# Patient Record
Sex: MALE | Race: BLACK OR AFRICAN AMERICAN | Employment: OTHER | URBAN - METROPOLITAN AREA
[De-identification: names, ages, dates, MRNs, and addresses within clinical notes are randomized per-mention and may not be internally consistent; named-entity substitution may affect disease eponyms.]

---

## 2017-02-09 RX ORDER — LISINOPRIL 10 MG/1
TABLET ORAL
Qty: 90 TABLET | Refills: 0 | Status: SHIPPED | OUTPATIENT
Start: 2017-02-09 | End: 2017-05-08 | Stop reason: SDUPTHER

## 2017-02-13 RX ORDER — DOLUTEGRAVIR SODIUM 50 MG/1
TABLET, FILM COATED ORAL
Qty: 90 TABLET | Refills: 2 | Status: SHIPPED | OUTPATIENT
Start: 2017-02-13 | End: 2017-11-10 | Stop reason: SDUPTHER

## 2017-03-27 RX ORDER — HYDROCHLOROTHIAZIDE 25 MG/1
TABLET ORAL
Qty: 90 TABLET | Refills: 3 | Status: SHIPPED | OUTPATIENT
Start: 2017-03-27 | End: 2018-03-22 | Stop reason: SDUPTHER

## 2017-04-17 ENCOUNTER — OFFICE VISIT (OUTPATIENT)
Dept: INFECTIOUS DISEASES | Age: 77
End: 2017-04-17

## 2017-04-17 VITALS
TEMPERATURE: 98.2 F | BODY MASS INDEX: 25.77 KG/M2 | DIASTOLIC BLOOD PRESSURE: 72 MMHG | SYSTOLIC BLOOD PRESSURE: 120 MMHG | WEIGHT: 190 LBS

## 2017-04-17 DIAGNOSIS — E05.90 HYPERTHYROIDISM: ICD-10-CM

## 2017-04-17 DIAGNOSIS — B20 HUMAN IMMUNODEFICIENCY VIRUS (HIV) DISEASE (HCC): Primary | ICD-10-CM

## 2017-04-17 DIAGNOSIS — H05.20 EXOPHTHALMOS: ICD-10-CM

## 2017-04-17 PROCEDURE — 99215 OFFICE O/P EST HI 40 MIN: CPT | Performed by: INTERNAL MEDICINE

## 2017-05-08 RX ORDER — LISINOPRIL 10 MG/1
TABLET ORAL
Qty: 90 TABLET | Refills: 3 | Status: SHIPPED | OUTPATIENT
Start: 2017-05-08 | End: 2018-05-03 | Stop reason: SDUPTHER

## 2017-08-21 ENCOUNTER — OFFICE VISIT (OUTPATIENT)
Dept: INFECTIOUS DISEASES | Age: 77
End: 2017-08-21

## 2017-08-21 VITALS
DIASTOLIC BLOOD PRESSURE: 84 MMHG | SYSTOLIC BLOOD PRESSURE: 120 MMHG | BODY MASS INDEX: 25.09 KG/M2 | TEMPERATURE: 98.6 F | WEIGHT: 185 LBS

## 2017-08-21 DIAGNOSIS — B20 HUMAN IMMUNODEFICIENCY VIRUS (HIV) DISEASE (HCC): Primary | ICD-10-CM

## 2017-08-21 DIAGNOSIS — B20 HUMAN IMMUNODEFICIENCY VIRUS (HIV) DISEASE (HCC): ICD-10-CM

## 2017-08-21 DIAGNOSIS — H05.20 EXOPHTHALMOS: ICD-10-CM

## 2017-08-21 DIAGNOSIS — E05.90 HYPERTHYROIDISM: ICD-10-CM

## 2017-08-21 LAB
A/G RATIO: 1.8 (ref 1.1–2.2)
ALBUMIN SERPL-MCNC: 4.7 G/DL (ref 3.4–5)
ALP BLD-CCNC: 50 U/L (ref 40–129)
ALT SERPL-CCNC: 16 U/L (ref 10–40)
ANION GAP SERPL CALCULATED.3IONS-SCNC: 13 MMOL/L (ref 3–16)
AST SERPL-CCNC: 16 U/L (ref 15–37)
BASOPHILS ABSOLUTE: 0 K/UL (ref 0–0.2)
BASOPHILS RELATIVE PERCENT: 0.6 %
BILIRUB SERPL-MCNC: 0.5 MG/DL (ref 0–1)
BILIRUBIN URINE: NEGATIVE
BLOOD, URINE: NEGATIVE
BUN BLDV-MCNC: 14 MG/DL (ref 7–20)
CALCIUM SERPL-MCNC: 9.7 MG/DL (ref 8.3–10.6)
CHLORIDE BLD-SCNC: 97 MMOL/L (ref 99–110)
CHOLESTEROL, FASTING: 174 MG/DL (ref 0–199)
CLARITY: CLEAR
CO2: 28 MMOL/L (ref 21–32)
COLOR: YELLOW
CREAT SERPL-MCNC: 1.1 MG/DL (ref 0.8–1.3)
EOSINOPHILS ABSOLUTE: 0.1 K/UL (ref 0–0.6)
EOSINOPHILS RELATIVE PERCENT: 2.3 %
EPITHELIAL CELLS, UA: 0 /HPF (ref 0–5)
GFR AFRICAN AMERICAN: >60
GFR NON-AFRICAN AMERICAN: >60
GLOBULIN: 2.6 G/DL
GLUCOSE BLD-MCNC: 104 MG/DL (ref 70–99)
GLUCOSE URINE: NEGATIVE MG/DL
HCT VFR BLD CALC: 43.6 % (ref 40.5–52.5)
HDLC SERPL-MCNC: 39 MG/DL (ref 40–60)
HEMOGLOBIN: 14.1 G/DL (ref 13.5–17.5)
HYALINE CASTS: 0 /LPF (ref 0–8)
KETONES, URINE: NEGATIVE MG/DL
LDL CHOLESTEROL CALCULATED: 112 MG/DL
LEUKOCYTE ESTERASE, URINE: NEGATIVE
LYMPHOCYTES ABSOLUTE: 1.1 K/UL (ref 1–5.1)
LYMPHOCYTES RELATIVE PERCENT: 34.5 %
MCH RBC QN AUTO: 27.9 PG (ref 26–34)
MCHC RBC AUTO-ENTMCNC: 32.4 G/DL (ref 31–36)
MCV RBC AUTO: 86.2 FL (ref 80–100)
MICROSCOPIC EXAMINATION: NORMAL
MONOCYTES ABSOLUTE: 0.4 K/UL (ref 0–1.3)
MONOCYTES RELATIVE PERCENT: 12.7 %
NEUTROPHILS ABSOLUTE: 1.6 K/UL (ref 1.7–7.7)
NEUTROPHILS RELATIVE PERCENT: 49.9 %
NITRITE, URINE: NEGATIVE
PDW BLD-RTO: 15.2 % (ref 12.4–15.4)
PH UA: 6
PLATELET # BLD: 171 K/UL (ref 135–450)
PMV BLD AUTO: 8.8 FL (ref 5–10.5)
POTASSIUM SERPL-SCNC: 4.5 MMOL/L (ref 3.5–5.1)
PROTEIN UA: NEGATIVE MG/DL
RBC # BLD: 5.06 M/UL (ref 4.2–5.9)
RBC UA: 3 /HPF (ref 0–4)
SODIUM BLD-SCNC: 138 MMOL/L (ref 136–145)
SPECIFIC GRAVITY UA: 1.02
TOTAL PROTEIN: 7.3 G/DL (ref 6.4–8.2)
TRIGLYCERIDE, FASTING: 114 MG/DL (ref 0–150)
UROBILINOGEN, URINE: 0.2 E.U./DL
VLDLC SERPL CALC-MCNC: 23 MG/DL
WBC # BLD: 3.3 K/UL (ref 4–11)
WBC UA: 0 /HPF (ref 0–5)

## 2017-08-21 PROCEDURE — 99215 OFFICE O/P EST HI 40 MIN: CPT | Performed by: INTERNAL MEDICINE

## 2017-08-22 LAB — RPR: NORMAL

## 2017-08-23 LAB
HIV RNA PCR INTERPRETATION: NOT DETECTED
HIV-1 RNA BY PCR, QN: <1.3 LOG
HIV-1 RNA BY PCR, QN: <20 CPY/ML

## 2017-08-25 ENCOUNTER — TELEPHONE (OUTPATIENT)
Dept: INFECTIOUS DISEASES | Age: 77
End: 2017-08-25

## 2017-09-22 ENCOUNTER — TELEPHONE (OUTPATIENT)
Dept: INFECTIOUS DISEASES | Age: 77
End: 2017-09-22

## 2017-09-22 RX ORDER — SILDENAFIL 50 MG/1
100 TABLET, FILM COATED ORAL PRN
Qty: 3 TABLET | Refills: 5 | Status: SHIPPED | OUTPATIENT
Start: 2017-09-22 | End: 2022-03-16

## 2017-11-09 RX ORDER — EMTRICITABINE AND TENOFOVIR ALAFENAMIDE 200; 25 MG/1; MG/1
TABLET ORAL
Qty: 90 TABLET | Refills: 0 | Status: SHIPPED | OUTPATIENT
Start: 2017-11-09 | End: 2018-01-28 | Stop reason: SDUPTHER

## 2017-11-10 RX ORDER — DOLUTEGRAVIR SODIUM 50 MG/1
TABLET, FILM COATED ORAL
Qty: 90 TABLET | Refills: 2 | Status: SHIPPED | OUTPATIENT
Start: 2017-11-10 | End: 2018-08-13 | Stop reason: SDUPTHER

## 2018-01-29 RX ORDER — EMTRICITABINE AND TENOFOVIR ALAFENAMIDE 200; 25 MG/1; MG/1
TABLET ORAL
Qty: 90 TABLET | Refills: 0 | Status: SHIPPED | OUTPATIENT
Start: 2018-01-29 | End: 2018-05-07 | Stop reason: SDUPTHER

## 2018-02-19 ENCOUNTER — OFFICE VISIT (OUTPATIENT)
Dept: INFECTIOUS DISEASES | Age: 78
End: 2018-02-19

## 2018-02-19 VITALS
DIASTOLIC BLOOD PRESSURE: 79 MMHG | HEIGHT: 73 IN | TEMPERATURE: 97.5 F | WEIGHT: 190 LBS | BODY MASS INDEX: 25.18 KG/M2 | SYSTOLIC BLOOD PRESSURE: 130 MMHG

## 2018-02-19 DIAGNOSIS — B20 HUMAN IMMUNODEFICIENCY VIRUS (HIV) DISEASE (HCC): ICD-10-CM

## 2018-02-19 DIAGNOSIS — B20 HUMAN IMMUNODEFICIENCY VIRUS (HIV) DISEASE (HCC): Primary | ICD-10-CM

## 2018-02-19 DIAGNOSIS — H05.20 EXOPHTHALMOS: ICD-10-CM

## 2018-02-19 LAB
A/G RATIO: 1.9 (ref 1.1–2.2)
ALBUMIN SERPL-MCNC: 4.9 G/DL (ref 3.4–5)
ALP BLD-CCNC: 51 U/L (ref 40–129)
ALT SERPL-CCNC: 22 U/L (ref 10–40)
ANION GAP SERPL CALCULATED.3IONS-SCNC: 15 MMOL/L (ref 3–16)
AST SERPL-CCNC: 18 U/L (ref 15–37)
BASOPHILS ABSOLUTE: 0 K/UL (ref 0–0.2)
BASOPHILS RELATIVE PERCENT: 0.6 %
BILIRUB SERPL-MCNC: 0.5 MG/DL (ref 0–1)
BILIRUBIN URINE: NEGATIVE
BLOOD, URINE: NEGATIVE
BUN BLDV-MCNC: 12 MG/DL (ref 7–20)
CALCIUM SERPL-MCNC: 9.7 MG/DL (ref 8.3–10.6)
CHLORIDE BLD-SCNC: 101 MMOL/L (ref 99–110)
CHOLESTEROL, FASTING: 186 MG/DL (ref 0–199)
CLARITY: CLEAR
CO2: 27 MMOL/L (ref 21–32)
COLOR: YELLOW
CREAT SERPL-MCNC: 1.2 MG/DL (ref 0.8–1.3)
EOSINOPHILS ABSOLUTE: 0.1 K/UL (ref 0–0.6)
EOSINOPHILS RELATIVE PERCENT: 3.6 %
EPITHELIAL CELLS, UA: 0 /HPF (ref 0–5)
GFR AFRICAN AMERICAN: >60
GFR NON-AFRICAN AMERICAN: 59
GLOBULIN: 2.6 G/DL
GLUCOSE BLD-MCNC: 95 MG/DL (ref 70–99)
GLUCOSE URINE: NEGATIVE MG/DL
HCT VFR BLD CALC: 42.4 % (ref 40.5–52.5)
HDLC SERPL-MCNC: 35 MG/DL (ref 40–60)
HEMOGLOBIN: 14.3 G/DL (ref 13.5–17.5)
HYALINE CASTS: 0 /LPF (ref 0–8)
KETONES, URINE: NEGATIVE MG/DL
LDL CHOLESTEROL CALCULATED: 123 MG/DL
LEUKOCYTE ESTERASE, URINE: NEGATIVE
LYMPHOCYTES ABSOLUTE: 1.2 K/UL (ref 1–5.1)
LYMPHOCYTES RELATIVE PERCENT: 39.1 %
MCH RBC QN AUTO: 28.6 PG (ref 26–34)
MCHC RBC AUTO-ENTMCNC: 33.7 G/DL (ref 31–36)
MCV RBC AUTO: 84.8 FL (ref 80–100)
MICROSCOPIC EXAMINATION: NORMAL
MONOCYTES ABSOLUTE: 0.4 K/UL (ref 0–1.3)
MONOCYTES RELATIVE PERCENT: 12.1 %
NEUTROPHILS ABSOLUTE: 1.4 K/UL (ref 1.7–7.7)
NEUTROPHILS RELATIVE PERCENT: 44.6 %
NITRITE, URINE: NEGATIVE
PDW BLD-RTO: 15.4 % (ref 12.4–15.4)
PH UA: 6
PLATELET # BLD: 165 K/UL (ref 135–450)
PMV BLD AUTO: 8.8 FL (ref 5–10.5)
POTASSIUM SERPL-SCNC: 4.6 MMOL/L (ref 3.5–5.1)
PROTEIN UA: NEGATIVE MG/DL
RBC # BLD: 5 M/UL (ref 4.2–5.9)
RBC UA: 1 /HPF (ref 0–4)
SODIUM BLD-SCNC: 143 MMOL/L (ref 136–145)
SPECIFIC GRAVITY UA: 1.02
TOTAL PROTEIN: 7.5 G/DL (ref 6.4–8.2)
TRIGLYCERIDE, FASTING: 139 MG/DL (ref 0–150)
UROBILINOGEN, URINE: 0.2 E.U./DL
VLDLC SERPL CALC-MCNC: 28 MG/DL
WBC # BLD: 3.1 K/UL (ref 4–11)
WBC UA: 0 /HPF (ref 0–5)

## 2018-02-19 PROCEDURE — 99215 OFFICE O/P EST HI 40 MIN: CPT | Performed by: INTERNAL MEDICINE

## 2018-02-19 NOTE — PROGRESS NOTES
daily.      amLODIPine (NORVASC) 10 MG tablet Take 1 tablet by mouth daily for 90 days. 90 tablet 3    nevirapine (VIRAMUNE) 200 MG tablet Take 1 tablet by mouth 2 times daily for 90 days. 180 tablet 3     No current facility-administered medications for this visit. Allergies:  Dye [iodides] and Pcn [penicillins]    Social History:    TOBACCO:    Never  ETOH:   None - quit  MARITAL STATUS:    (wife [address]  in 2016)  OCCUPATION:   Retired  (worked at [employer])    Family History:   No immunodeficiency. REVIEW OF SYSTEMS:    No fever / chills / sweats. Denies cough / sputum   Denies chest pain / palpitation. Denies n / v / abd pain. No diarrhea. Denies dysuria / frequency  Denies joint swelling or pain. No myalgia. Denies weight loss. PHYSICAL EXAM:    Vitals:    /79 (Site: Left Arm)   Temp 97.5 °F (36.4 °C) (Oral)   Ht 6' 1\" (1.854 m)   Wt 190 lb (86.2 kg)   BMI 25.07 kg/m²       GENERAL: No apparent distress. HEENT: Membranes moist, no conjunctival changes, no oral lesion - +exophthmos, no change  NECK:  Supple, no masses  LYMPH: No adenopathy   LUNGS: Clear b/l, no rales, no dullness  CARDIAC: RRR, no murmur appreciated  ABD:  + BS, soft / NT, no masses  :  Deferred   EXT:  No rash, no edema, no lesions  NEURO: No focal neurologic findings  PSYCH: Orientation, sensorium, mood normal      DATA:    12: WBC 3.3, Cr 1.2, AST / ALT , CD4 660, VL <20  13:  WBC 3.2, Cr 1.2, AST / ALT   10/1/13 WBC 3.2, Cr 1.2, CD4 526 (43%), VL <20  14 WBC 3.4, Cr 1.24, CD4 539 (46%), VL <20  10/8/14 WBC 3.1, Cr 1.3, CD4 526 (47%), VL <20  11/3/15 WBC 3.5, Cr 1.39, CD4 568, VL <20    IMPRESSION    # HIV, on neviripine / Clemmie Netters since 2012 (had been on d4T, ddI, nevirapine since ), last labs - see above, VL consistently suppressed by ultrasensitive assay, compliant / adherent and tolerating this regimen.   Modified HIV meds 10/8/14 - substituted

## 2018-02-21 LAB
C. TRACHOMATIS DNA ,URINE: NEGATIVE
HIV RNA PCR INTERPRETATION: NOT DETECTED
HIV-1 RNA BY PCR, QN: <1.3 LOG
HIV-1 RNA BY PCR, QN: <20 CPY/ML
N. GONORRHOEAE DNA, URINE: NEGATIVE

## 2018-02-23 ENCOUNTER — TELEPHONE (OUTPATIENT)
Dept: INFECTIOUS DISEASES | Age: 78
End: 2018-02-23

## 2018-03-22 RX ORDER — HYDROCHLOROTHIAZIDE 25 MG/1
TABLET ORAL
Qty: 90 TABLET | Refills: 3 | Status: SHIPPED | OUTPATIENT
Start: 2018-03-22 | End: 2019-03-18 | Stop reason: SDUPTHER

## 2018-05-03 RX ORDER — LISINOPRIL 10 MG/1
TABLET ORAL
Qty: 90 TABLET | Refills: 3 | Status: SHIPPED | OUTPATIENT
Start: 2018-05-03 | End: 2019-04-29 | Stop reason: SDUPTHER

## 2018-08-13 RX ORDER — DOLUTEGRAVIR SODIUM 50 MG/1
TABLET, FILM COATED ORAL
Qty: 90 TABLET | Refills: 2 | Status: SHIPPED | OUTPATIENT
Start: 2018-08-13 | End: 2019-05-01 | Stop reason: SDUPTHER

## 2018-08-20 ENCOUNTER — OFFICE VISIT (OUTPATIENT)
Dept: INFECTIOUS DISEASES | Age: 78
End: 2018-08-20

## 2018-08-20 VITALS
HEIGHT: 73 IN | DIASTOLIC BLOOD PRESSURE: 80 MMHG | SYSTOLIC BLOOD PRESSURE: 110 MMHG | BODY MASS INDEX: 25.45 KG/M2 | WEIGHT: 192 LBS | TEMPERATURE: 98.5 F

## 2018-08-20 DIAGNOSIS — B20 HUMAN IMMUNODEFICIENCY VIRUS (HIV) DISEASE (HCC): Primary | Chronic | ICD-10-CM

## 2018-08-20 DIAGNOSIS — B20 HUMAN IMMUNODEFICIENCY VIRUS (HIV) DISEASE (HCC): Chronic | ICD-10-CM

## 2018-08-20 DIAGNOSIS — E05.90 HYPERTHYROIDISM: Chronic | ICD-10-CM

## 2018-08-20 DIAGNOSIS — H05.20 EXOPHTHALMOS: Chronic | ICD-10-CM

## 2018-08-20 LAB
A/G RATIO: 1.8 (ref 1.1–2.2)
ALBUMIN SERPL-MCNC: 4.8 G/DL (ref 3.4–5)
ALP BLD-CCNC: 50 U/L (ref 40–129)
ALT SERPL-CCNC: 19 U/L (ref 10–40)
ANION GAP SERPL CALCULATED.3IONS-SCNC: 16 MMOL/L (ref 3–16)
AST SERPL-CCNC: 16 U/L (ref 15–37)
BASOPHILS ABSOLUTE: 0 K/UL (ref 0–0.2)
BASOPHILS RELATIVE PERCENT: 0.7 %
BILIRUB SERPL-MCNC: 0.5 MG/DL (ref 0–1)
BUN BLDV-MCNC: 13 MG/DL (ref 7–20)
CALCIUM SERPL-MCNC: 9.9 MG/DL (ref 8.3–10.6)
CHLORIDE BLD-SCNC: 95 MMOL/L (ref 99–110)
CO2: 29 MMOL/L (ref 21–32)
CREAT SERPL-MCNC: 1.3 MG/DL (ref 0.8–1.3)
EOSINOPHILS ABSOLUTE: 0.1 K/UL (ref 0–0.6)
EOSINOPHILS RELATIVE PERCENT: 2.3 %
GFR AFRICAN AMERICAN: >60
GFR NON-AFRICAN AMERICAN: 54
GLOBULIN: 2.7 G/DL
GLUCOSE BLD-MCNC: 102 MG/DL (ref 70–99)
HCT VFR BLD CALC: 45.3 % (ref 40.5–52.5)
HEMOGLOBIN: 14.8 G/DL (ref 13.5–17.5)
LYMPHOCYTES ABSOLUTE: 1.2 K/UL (ref 1–5.1)
LYMPHOCYTES RELATIVE PERCENT: 35.3 %
MCH RBC QN AUTO: 28 PG (ref 26–34)
MCHC RBC AUTO-ENTMCNC: 32.7 G/DL (ref 31–36)
MCV RBC AUTO: 85.7 FL (ref 80–100)
MONOCYTES ABSOLUTE: 0.4 K/UL (ref 0–1.3)
MONOCYTES RELATIVE PERCENT: 10.7 %
NEUTROPHILS ABSOLUTE: 1.7 K/UL (ref 1.7–7.7)
NEUTROPHILS RELATIVE PERCENT: 51 %
PDW BLD-RTO: 16 % (ref 12.4–15.4)
PLATELET # BLD: 173 K/UL (ref 135–450)
PMV BLD AUTO: 9.1 FL (ref 5–10.5)
POTASSIUM SERPL-SCNC: 4.3 MMOL/L (ref 3.5–5.1)
RBC # BLD: 5.29 M/UL (ref 4.2–5.9)
SODIUM BLD-SCNC: 140 MMOL/L (ref 136–145)
TOTAL PROTEIN: 7.5 G/DL (ref 6.4–8.2)
WBC # BLD: 3.4 K/UL (ref 4–11)

## 2018-08-20 PROCEDURE — 99215 OFFICE O/P EST HI 40 MIN: CPT | Performed by: INTERNAL MEDICINE

## 2018-08-20 NOTE — PROGRESS NOTES
for nevirapine yet CD4 down VL up and pt was NOT taking this regimen on 6/1/15. Substituted descovy for truvada 2016. 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  # Seasonal rhinitis- better, if worse try OTC cetirizine  # Watery eyes, may allergic, try antihistimine drops, cetirizine drop are ketotifen 1 ggt each eye  # Testicular 'cyst' - s/p removal  [facility]  # Anti-vaccine belief    HIV Database:   - Baseline: CD4, HIV RNA VL, HIV genotype, HLA B*5701 negative 7/27/15, hep serologies (10/2014 - A / B / C NR), RPR, G6PD;   - Annual: PPD, RPR , chol (every 6 mo if abnormal or on meds)   - 3-4 times a year: visit, CD4/VL, CBC, LFT  - Vaccines: influenza - refused 10/17/16, pneumovax 2012 / Reklaw Rater 2/2/15, hep A vaccine (immune - 10/8/14), hep B vaccine (immune, past infection 10/9/14)   - STD screening, cervical / anal ca screening, other vaccines (Tdap, Zoster), other cancer screening    HIV Health Maintenance:  HIV:  Diagnosis / RF 1991 / sexual - heterosexual partners   HIV genotype     HLA   17 negative   CD4    17 - 665 (52)   HIV VL   17 - <20     Vaccines:   Pneumovax   2012 - refused new    Prevnar  2/2/15   Meningococcal vax  Refuses    Flu vax   Refused    HAV vax  10/8/14 HAV ab +   HBV vax   10/8/14 HBV S ab +    Tdap    10/17/16      Labs   G6PD       RPR    17 NR    PPD or quantiferon  2/2/15 neg    HCV    10/8/14 neg     Lipid panel          / STD  Exam    10/17/16     GC/CT       Anal PAP/HPV  10/17/16 non-diagnostic, refuses repeat      Referrals:  PCP    Krystal Garcia  Endocrine  [place]      HIV Counselin. Discussed with patient transmission, infection and prevention of HIV infection.     2. Discussed with patient testing of CD4 count and HIV viral load and how these test relate to HIV care. 3. Discussed with patient ongoing test for HIV care including blood counts, LFT, PPD, RPR.  4. Discussed with patient measures to prevent HIV transmission to others. Discussed sexual transmission and safe sex practices. Discussed avoidance of needle sharing or use that may promote HIV transmission. 5. Asked patient what question he / she has and addressed concerns. RECOMMENDATIONS:      1. HIV treatment - cont descovy (emtricitabine / tenofovir - diff formulation) 1/d + dolutegravir (tivicay). 2. Adherence  3. Labs today - CBC with diff , CMP, CD4, HIV VL  4. OTC antihistamine for seasonal rhinitis; takes cetirizine (ketotifen) for watery eyes  5. BP med - Lisinopril 10/d, HCTZ 25/d; thyroid med - methimizole 5 mg/d - reviewed  6. RHCM - PPD (2/2015), pneumovax 4/17/17, prevnar 2/2/15, flu shot refused; RPR 3/1/16, chol panel 7/2015, MVI; never has had colonoscopy; Tdap 10/17/16; Refuses repeat anal cancer screen (swab for cytology / HPV - indeterminate / invalid 10/17/17 - refuses repeat - again 8/20/18)  7. Psychosocial -  7/2016, nutrition, exercise - discussed     Pt refuses indicated vaccine, procedures consistently - flu vaccine, meningococcal vaccine; colonoscopy, anal cancer screen. Have discussed risk of developing preventable disease. He concerned / calls test / vaccines 'experimenting'    Spent over 40 minutes with patient, reviewing data, discussing multiple problems and medications, formulating plan.   Over 50% of time with patient, educating and counseling about HIV care, medications, health screening    Jose Martinez MD

## 2018-08-23 LAB
HIV RNA PCR INTERPRETATION: DETECTED
HIV-1 RNA BY PCR, QN: 1.8 LOG
HIV-1 RNA BY PCR, QN: 59 CPY/ML

## 2018-08-24 ENCOUNTER — TELEPHONE (OUTPATIENT)
Dept: INFECTIOUS DISEASES | Age: 78
End: 2018-08-24

## 2019-01-28 ENCOUNTER — OFFICE VISIT (OUTPATIENT)
Dept: INFECTIOUS DISEASES | Age: 79
End: 2019-01-28
Payer: MEDICARE

## 2019-01-28 VITALS
DIASTOLIC BLOOD PRESSURE: 86 MMHG | BODY MASS INDEX: 25.18 KG/M2 | TEMPERATURE: 98.6 F | SYSTOLIC BLOOD PRESSURE: 140 MMHG | WEIGHT: 190 LBS | HEIGHT: 73 IN

## 2019-01-28 DIAGNOSIS — B20 HUMAN IMMUNODEFICIENCY VIRUS (HIV) DISEASE (HCC): Primary | ICD-10-CM

## 2019-01-28 DIAGNOSIS — E05.90 HYPERTHYROIDISM: ICD-10-CM

## 2019-01-28 DIAGNOSIS — H05.20 EXOPHTHALMOS: ICD-10-CM

## 2019-01-28 DIAGNOSIS — I10 ESSENTIAL HYPERTENSION: ICD-10-CM

## 2019-01-28 DIAGNOSIS — B20 HUMAN IMMUNODEFICIENCY VIRUS (HIV) DISEASE (HCC): ICD-10-CM

## 2019-01-28 LAB
A/G RATIO: 1.6 (ref 1.1–2.2)
ALBUMIN SERPL-MCNC: 4.9 G/DL (ref 3.4–5)
ALP BLD-CCNC: 55 U/L (ref 40–129)
ALT SERPL-CCNC: 22 U/L (ref 10–40)
ANION GAP SERPL CALCULATED.3IONS-SCNC: 14 MMOL/L (ref 3–16)
AST SERPL-CCNC: 18 U/L (ref 15–37)
BASOPHILS ABSOLUTE: 0 K/UL (ref 0–0.2)
BASOPHILS RELATIVE PERCENT: 0.6 %
BILIRUB SERPL-MCNC: 0.5 MG/DL (ref 0–1)
BUN BLDV-MCNC: 11 MG/DL (ref 7–20)
CALCIUM SERPL-MCNC: 10 MG/DL (ref 8.3–10.6)
CHLORIDE BLD-SCNC: 101 MMOL/L (ref 99–110)
CO2: 28 MMOL/L (ref 21–32)
CREAT SERPL-MCNC: 1.1 MG/DL (ref 0.8–1.3)
EOSINOPHILS ABSOLUTE: 0 K/UL (ref 0–0.6)
EOSINOPHILS RELATIVE PERCENT: 1.5 %
GFR AFRICAN AMERICAN: >60
GFR NON-AFRICAN AMERICAN: >60
GLOBULIN: 3.1 G/DL
GLUCOSE BLD-MCNC: 109 MG/DL (ref 70–99)
HCT VFR BLD CALC: 46.3 % (ref 40.5–52.5)
HEMOGLOBIN: 15.2 G/DL (ref 13.5–17.5)
LYMPHOCYTES ABSOLUTE: 1.1 K/UL (ref 1–5.1)
LYMPHOCYTES RELATIVE PERCENT: 34.7 %
MCH RBC QN AUTO: 28.4 PG (ref 26–34)
MCHC RBC AUTO-ENTMCNC: 32.9 G/DL (ref 31–36)
MCV RBC AUTO: 86.5 FL (ref 80–100)
MONOCYTES ABSOLUTE: 0.4 K/UL (ref 0–1.3)
MONOCYTES RELATIVE PERCENT: 11.6 %
NEUTROPHILS ABSOLUTE: 1.6 K/UL (ref 1.7–7.7)
NEUTROPHILS RELATIVE PERCENT: 51.6 %
PDW BLD-RTO: 15.5 % (ref 12.4–15.4)
PLATELET # BLD: 185 K/UL (ref 135–450)
PMV BLD AUTO: 8.9 FL (ref 5–10.5)
POTASSIUM SERPL-SCNC: 4.4 MMOL/L (ref 3.5–5.1)
RBC # BLD: 5.36 M/UL (ref 4.2–5.9)
SODIUM BLD-SCNC: 143 MMOL/L (ref 136–145)
TOTAL PROTEIN: 8 G/DL (ref 6.4–8.2)
WBC # BLD: 3 K/UL (ref 4–11)

## 2019-01-28 PROCEDURE — 99215 OFFICE O/P EST HI 40 MIN: CPT | Performed by: INTERNAL MEDICINE

## 2019-01-30 LAB
HIV-1 QNT LOG, IU/ML: NOT DETECTED LOG CPY/ML
HIV-1 QNT, IU/ML: NOT DETECTED CPY/ML
INTERPRETATION: NOT DETECTED

## 2019-02-01 ENCOUNTER — TELEPHONE (OUTPATIENT)
Dept: INFECTIOUS DISEASES | Age: 79
End: 2019-02-01

## 2019-03-18 RX ORDER — HYDROCHLOROTHIAZIDE 25 MG/1
TABLET ORAL
Qty: 90 TABLET | Refills: 3 | Status: SHIPPED | OUTPATIENT
Start: 2019-03-18 | End: 2020-09-22 | Stop reason: SDUPTHER

## 2019-04-29 RX ORDER — LISINOPRIL 10 MG/1
TABLET ORAL
Qty: 90 TABLET | Refills: 3 | Status: SHIPPED | OUTPATIENT
Start: 2019-04-29 | End: 2022-03-16 | Stop reason: DRUGHIGH

## 2019-05-01 RX ORDER — EMTRICITABINE AND TENOFOVIR ALAFENAMIDE 200; 25 MG/1; MG/1
TABLET ORAL
Qty: 90 TABLET | Refills: 3 | Status: SHIPPED | OUTPATIENT
Start: 2019-05-01 | End: 2020-02-05

## 2019-05-01 RX ORDER — DOLUTEGRAVIR SODIUM 50 MG/1
TABLET, FILM COATED ORAL
Qty: 90 TABLET | Refills: 2 | Status: SHIPPED | OUTPATIENT
Start: 2019-05-01 | End: 2020-01-14

## 2019-05-20 ENCOUNTER — OFFICE VISIT (OUTPATIENT)
Dept: INFECTIOUS DISEASES | Age: 79
End: 2019-05-20
Payer: MEDICARE

## 2019-05-20 VITALS
SYSTOLIC BLOOD PRESSURE: 122 MMHG | TEMPERATURE: 98.2 F | HEIGHT: 73 IN | BODY MASS INDEX: 25.45 KG/M2 | DIASTOLIC BLOOD PRESSURE: 80 MMHG | WEIGHT: 192 LBS

## 2019-05-20 DIAGNOSIS — B20 HUMAN IMMUNODEFICIENCY VIRUS (HIV) DISEASE (HCC): ICD-10-CM

## 2019-05-20 DIAGNOSIS — B20 HUMAN IMMUNODEFICIENCY VIRUS (HIV) DISEASE (HCC): Primary | ICD-10-CM

## 2019-05-20 DIAGNOSIS — E05.90 HYPERTHYROIDISM: ICD-10-CM

## 2019-05-20 DIAGNOSIS — H05.20 EXOPHTHALMOS: ICD-10-CM

## 2019-05-20 LAB
A/G RATIO: 1.7 (ref 1.1–2.2)
ALBUMIN SERPL-MCNC: 5 G/DL (ref 3.4–5)
ALP BLD-CCNC: 55 U/L (ref 40–129)
ALT SERPL-CCNC: 17 U/L (ref 10–40)
ANION GAP SERPL CALCULATED.3IONS-SCNC: 15 MMOL/L (ref 3–16)
AST SERPL-CCNC: 15 U/L (ref 15–37)
BASOPHILS ABSOLUTE: 0 K/UL (ref 0–0.2)
BASOPHILS RELATIVE PERCENT: 0.3 %
BILIRUB SERPL-MCNC: 0.6 MG/DL (ref 0–1)
BUN BLDV-MCNC: 13 MG/DL (ref 7–20)
CALCIUM SERPL-MCNC: 10 MG/DL (ref 8.3–10.6)
CHLORIDE BLD-SCNC: 99 MMOL/L (ref 99–110)
CO2: 27 MMOL/L (ref 21–32)
CREAT SERPL-MCNC: 1.3 MG/DL (ref 0.8–1.3)
EOSINOPHILS ABSOLUTE: 0.1 K/UL (ref 0–0.6)
EOSINOPHILS RELATIVE PERCENT: 1.8 %
GFR AFRICAN AMERICAN: >60
GFR NON-AFRICAN AMERICAN: 54
GLOBULIN: 3 G/DL
GLUCOSE BLD-MCNC: 106 MG/DL (ref 70–99)
HCT VFR BLD CALC: 45.6 % (ref 40.5–52.5)
HEMOGLOBIN: 14.9 G/DL (ref 13.5–17.5)
LYMPHOCYTES ABSOLUTE: 1.1 K/UL (ref 1–5.1)
LYMPHOCYTES RELATIVE PERCENT: 30.9 %
MCH RBC QN AUTO: 28.3 PG (ref 26–34)
MCHC RBC AUTO-ENTMCNC: 32.7 G/DL (ref 31–36)
MCV RBC AUTO: 86.6 FL (ref 80–100)
MONOCYTES ABSOLUTE: 0.5 K/UL (ref 0–1.3)
MONOCYTES RELATIVE PERCENT: 14 %
NEUTROPHILS ABSOLUTE: 1.8 K/UL (ref 1.7–7.7)
NEUTROPHILS RELATIVE PERCENT: 53 %
PDW BLD-RTO: 15.4 % (ref 12.4–15.4)
PLATELET # BLD: 190 K/UL (ref 135–450)
PMV BLD AUTO: 8.8 FL (ref 5–10.5)
POTASSIUM SERPL-SCNC: 4.6 MMOL/L (ref 3.5–5.1)
RBC # BLD: 5.26 M/UL (ref 4.2–5.9)
SODIUM BLD-SCNC: 141 MMOL/L (ref 136–145)
TOTAL PROTEIN: 8 G/DL (ref 6.4–8.2)
TSH REFLEX FT4: 3.43 UIU/ML (ref 0.27–4.2)
VITAMIN D 25-HYDROXY: 33.4 NG/ML
WBC # BLD: 3.5 K/UL (ref 4–11)

## 2019-05-20 PROCEDURE — 99215 OFFICE O/P EST HI 40 MIN: CPT | Performed by: INTERNAL MEDICINE

## 2019-05-20 NOTE — PROGRESS NOTES
Infectious Diseases HIV Outpatient Note    HIV history:   Diagnosed 1991 with lymphadenopathy. RF - multiple female sexual encounters (no MSM, IVDU). Seen in past by Dr Lindsey Roman and transferred to me 1/2012. Primary Care Physician: Arthur Cruz - PCP Dr Liliana Betancourt  History Obtained From:  Patient    CHIEF COMPLAINT:    Chief Complaint   Patient presents with    Follow-up       HISTORY OF PRESENT ILLNESS / Interval history:      Seen 10/9/13, 2/10/14, 6/9/14, 10/8/14, 2/2/15, 5/20/15, 6/1/15, 7/20/15, 10/26/15, 2/29/16, 6/13/16, 10/17/16, 4/17/17, 8/21/17, 2/19/18, 8/20/18, 1/28/19. Today 5/20/19, pt feels good, taking HIV meds. Taking meds as prescribed, \"never misses a dose\". Says, he wishes he did not have to take meds. No new problem. No illness since last visit. Still with watery eyes.      Past Medical History:    HIV+, HTN, seasonal allergies, hyperthyroid    Past Surgical History:    Axillary LN (1991)      Current Outpatient Medications   Medication Sig Dispense Refill    TIVICAY 50 MG tablet TAKE 1 TABLET DAILY 90 tablet 2    DESCOVY 200-25 MG TABS tablet TAKE 1 TABLET DAILY 90 tablet 3    lisinopril (PRINIVIL;ZESTRIL) 10 MG tablet TAKE 1 TABLET DAILY 90 tablet 3    hydrochlorothiazide (HYDRODIURIL) 25 MG tablet TAKE 1 TABLET DAILY 90 tablet 3    sildenafil (VIAGRA) 50 MG tablet Take 2 tablets by mouth as needed for Erectile Dysfunction Take 1 tablet PO as needed for Erectile Dysfunction 3 tablet 5    methimazole (TAPAZOLE) 10 MG tablet TAKE 1 TABLET DAILY 90 tablet 2    naproxen (NAPROSYN) 375 MG tablet Take 1 tablet by mouth 2 times daily (with meals) for 10 days 30 tablet 1    emtricitabine-tenofovir (TRUVADA) 200-300 MG per tablet Take 1 tablet by mouth daily 90 tablet 3    TRUVADA 200-300 MG per tablet TAKE 1 TABLET DAILY 30 tablet 5    dolutegravir sodium (TIVICAY) 50 MG tablet Take 1 tablet by mouth daily 30 tablet 3    hydrochlorothiazide (HYDRODIURIL) 25 MG tablet Take 25 mg by mouth daily.  amLODIPine (NORVASC) 10 MG tablet Take 1 tablet by mouth daily for 90 days. 90 tablet 3     No current facility-administered medications for this visit. Allergies:  Dye [iodides] and Pcn [penicillins]    Social History:    TOBACCO:    Never  ETOH:   None - quit  MARITAL STATUS:    (wife Vanessa Gerber  in 2016)  OCCUPATION:   Retired  (worked at [employer])    Family History:   No immunodeficiency. REVIEW OF SYSTEMS:    No fever / chills / sweats. Denies cough / sputum   Denies chest pain / palpitation. Denies n / v / abd pain. No diarrhea. Denies dysuria / frequency  Denies joint swelling or pain. No myalgia. Denies weight loss. PHYSICAL EXAM:    Vitals:    /80   Temp 98.2 °F (36.8 °C) (Oral)   Ht 6' 1\" (1.854 m)   Wt 192 lb (87.1 kg)   BMI 25.33 kg/m²       GENERAL: No apparent distress.     HEENT: Membranes moist, no conjunctival changes, no oral lesion - + exophthmos, no change  NECK:  Supple, no masses  LYMPH: No adenopathy   LUNGS: Clear b/l, no rales, no dullness  CARDIAC: RRR, no murmur appreciated  ABD:  + BS, soft / NT, no masses  :  Deferred   EXT:  No rash, no edema, no lesions  NEURO: No focal neurologic findings  PSYCH: Orientation, sensorium, mood normal      DATA:    12: WBC 3.3, Cr 1.2, AST / ALT 23 / 26, CD4 660, VL <20  13:  WBC 3.2, Cr 1.2, AST / ALT   10/1/13 WBC 3.2, Cr 1.2, CD4 526 (43%), VL <20  14 WBC 3.4, Cr 1.24, CD4 539 (46%), VL <20  10/8/14 WBC 3.1, Cr 1.3, CD4 526 (47%), VL <20  11/3/15 WBC 3.5, Cr 1.39, CD4 568, VL <20    IMPRESSION    HIV Database:   - Baseline: CD4, HIV RNA VL, HIV genotype, HLA B*5701 negative 7/27/15, hep serologies (10/2014 - A / B / C NR), RPR, G6PD;   - Annual: PPD, RPR , chol (every 6 mo if abnormal or on meds)   - 3-4 times a year: visit, CD4/VL, CBC, LFT  - Vaccines: influenza - refused 10/17/16, pneumovax 2012 / Guy Quan 2/2/15, hep A vaccine (immune - 10/8/14), hep B dysfunction - written for cialis, never used this  # testicular cyst - per pt, no details  # Seasonal rhinitis- better, if worse try OTC cetirizine  # Watery eyes, may allergic, try antihistimine drops, cetirizine drop are ketotifen 1 ggt each eye  # Testicular 'cyst' - s/p removal 7/18 Dr Est[facility]an Negr[facility]  # Anti-vaccine belief  # Does not want cancer screening    RECOMMENDATIONS:    1. HIV treatment - cont descovy (emtricitabine /TAF) 1/d + dolutegravir (tivicay). 2. Adherence  3. Labs today - CBC with diff , CMP, CD4, HIV VL, TSH, Vit D 25-OH  4. OTC antihistamine for seasonal rhinitis; takes cetirizine (ketotifen) for watery eyes  5. BP med - Lisinopril 10/d, HCTZ 25/d; thyroid med - methimizole 5 mg/d - reviewed  6. RHCM - PPD (2/2015), pneumovax 4/17/17, prevnar 2/2/15, flu shot refused; RPR 3/1/16, chol panel 7/2015, MVI; never has had colonoscopy; Tdap 10/17/16; Refuses repeat anal cancer screen (swab for cytology / HPV - indeterminate / invalid 10/17/17 - refuses repeat - again 8/20/18)  7. Psychosocial -  7/2016, nutrition, exercise - discussed     Pt refuses indicated vaccine, procedures consistently - flu vaccine, meningococcal vaccine; colonoscopy, anal cancer screen. Have discussed risk of developing preventable disease. He concerned / calls test / vaccines 'experimenting' - discussed all issues again today 1/28/19    Spent over 40 minutes with patient, reviewing data, discussing multiple problems and medications, formulating plan.   Over 50% of time with patient, educating and counseling about HIV care, medications, health screening    Silva Soares MD

## 2019-05-24 ENCOUNTER — TELEPHONE (OUTPATIENT)
Dept: INFECTIOUS DISEASES | Age: 79
End: 2019-05-24

## 2019-05-24 NOTE — TELEPHONE ENCOUNTER
Your viral load is undetectable, CD 4 is 631, creatinine is 1.3, all the other labs are normal    Left a message to call back for his results

## 2019-10-14 ENCOUNTER — OFFICE VISIT (OUTPATIENT)
Dept: INFECTIOUS DISEASES | Age: 79
End: 2019-10-14
Payer: MEDICARE

## 2019-10-14 ENCOUNTER — TELEPHONE (OUTPATIENT)
Dept: INFECTIOUS DISEASES | Age: 79
End: 2019-10-14

## 2019-10-14 VITALS
SYSTOLIC BLOOD PRESSURE: 138 MMHG | TEMPERATURE: 97.9 F | BODY MASS INDEX: 25.06 KG/M2 | WEIGHT: 185 LBS | DIASTOLIC BLOOD PRESSURE: 82 MMHG | HEIGHT: 72 IN

## 2019-10-14 DIAGNOSIS — E05.90 HYPERTHYROIDISM: ICD-10-CM

## 2019-10-14 DIAGNOSIS — B20 HUMAN IMMUNODEFICIENCY VIRUS (HIV) DISEASE (HCC): Primary | ICD-10-CM

## 2019-10-14 DIAGNOSIS — I10 ESSENTIAL HYPERTENSION: ICD-10-CM

## 2019-10-14 DIAGNOSIS — H05.20 EXOPHTHALMOS: ICD-10-CM

## 2019-10-14 DIAGNOSIS — B37.0 THRUSH: Primary | ICD-10-CM

## 2019-10-14 LAB
A/G RATIO: 2.1 (ref 1.1–2.2)
ALBUMIN SERPL-MCNC: 5.1 G/DL (ref 3.4–5)
ALP BLD-CCNC: 55 U/L (ref 40–129)
ALT SERPL-CCNC: 19 U/L (ref 10–40)
ANION GAP SERPL CALCULATED.3IONS-SCNC: 14 MMOL/L (ref 3–16)
AST SERPL-CCNC: 16 U/L (ref 15–37)
BASOPHILS ABSOLUTE: 0 K/UL (ref 0–0.2)
BASOPHILS RELATIVE PERCENT: 0.5 %
BILIRUB SERPL-MCNC: 0.5 MG/DL (ref 0–1)
BUN BLDV-MCNC: 13 MG/DL (ref 7–20)
CALCIUM SERPL-MCNC: 9.7 MG/DL (ref 8.3–10.6)
CHLORIDE BLD-SCNC: 100 MMOL/L (ref 99–110)
CO2: 28 MMOL/L (ref 21–32)
CREAT SERPL-MCNC: 1.2 MG/DL (ref 0.8–1.3)
EOSINOPHILS ABSOLUTE: 0.1 K/UL (ref 0–0.6)
EOSINOPHILS RELATIVE PERCENT: 2.3 %
GFR AFRICAN AMERICAN: >60
GFR NON-AFRICAN AMERICAN: 59
GLOBULIN: 2.4 G/DL
GLUCOSE BLD-MCNC: 126 MG/DL (ref 70–99)
HCT VFR BLD CALC: 43 % (ref 40.5–52.5)
HEMOGLOBIN: 14 G/DL (ref 13.5–17.5)
LYMPHOCYTES ABSOLUTE: 1 K/UL (ref 1–5.1)
LYMPHOCYTES RELATIVE PERCENT: 28.5 %
MCH RBC QN AUTO: 28.2 PG (ref 26–34)
MCHC RBC AUTO-ENTMCNC: 32.6 G/DL (ref 31–36)
MCV RBC AUTO: 86.3 FL (ref 80–100)
MONOCYTES ABSOLUTE: 0.4 K/UL (ref 0–1.3)
MONOCYTES RELATIVE PERCENT: 10.5 %
NEUTROPHILS ABSOLUTE: 2.1 K/UL (ref 1.7–7.7)
NEUTROPHILS RELATIVE PERCENT: 58.2 %
PDW BLD-RTO: 15.5 % (ref 12.4–15.4)
PLATELET # BLD: 184 K/UL (ref 135–450)
PMV BLD AUTO: 8.9 FL (ref 5–10.5)
POTASSIUM SERPL-SCNC: 3.9 MMOL/L (ref 3.5–5.1)
RBC # BLD: 4.98 M/UL (ref 4.2–5.9)
SODIUM BLD-SCNC: 142 MMOL/L (ref 136–145)
TOTAL PROTEIN: 7.5 G/DL (ref 6.4–8.2)
WBC # BLD: 3.7 K/UL (ref 4–11)

## 2019-10-14 PROCEDURE — 36415 COLL VENOUS BLD VENIPUNCTURE: CPT | Performed by: INTERNAL MEDICINE

## 2019-10-14 PROCEDURE — 99215 OFFICE O/P EST HI 40 MIN: CPT | Performed by: INTERNAL MEDICINE

## 2019-10-14 RX ORDER — FLUCONAZOLE 100 MG/1
100 TABLET ORAL DAILY
Qty: 7 TABLET | Refills: 0 | Status: SHIPPED | OUTPATIENT
Start: 2019-10-14 | End: 2019-10-21

## 2020-01-14 RX ORDER — DOLUTEGRAVIR SODIUM 50 MG/1
TABLET, FILM COATED ORAL
Qty: 90 TABLET | Refills: 4 | Status: SHIPPED | OUTPATIENT
Start: 2020-01-14 | End: 2022-01-06 | Stop reason: SDUPTHER

## 2020-02-05 RX ORDER — EMTRICITABINE AND TENOFOVIR ALAFENAMIDE 200; 25 MG/1; MG/1
TABLET ORAL
Qty: 90 TABLET | Refills: 4 | Status: SHIPPED | OUTPATIENT
Start: 2020-02-05 | End: 2020-02-11

## 2020-02-11 RX ORDER — EMTRICITABINE AND TENOFOVIR ALAFENAMIDE 200; 25 MG/1; MG/1
TABLET ORAL
Qty: 90 TABLET | Refills: 4 | Status: SHIPPED | OUTPATIENT
Start: 2020-02-11 | End: 2021-01-18 | Stop reason: SDUPTHER

## 2020-02-12 RX ORDER — EMTRICITABINE AND TENOFOVIR ALAFENAMIDE 200; 25 MG/1; MG/1
TABLET ORAL
Qty: 60 TABLET | Refills: 3 | OUTPATIENT
Start: 2020-02-12

## 2020-05-20 ENCOUNTER — OFFICE VISIT (OUTPATIENT)
Dept: INFECTIOUS DISEASES | Age: 80
End: 2020-05-20
Payer: MEDICARE

## 2020-05-20 VITALS
TEMPERATURE: 97.7 F | HEIGHT: 73 IN | DIASTOLIC BLOOD PRESSURE: 88 MMHG | WEIGHT: 194.8 LBS | BODY MASS INDEX: 25.82 KG/M2 | SYSTOLIC BLOOD PRESSURE: 150 MMHG

## 2020-05-20 DIAGNOSIS — B20 HUMAN IMMUNODEFICIENCY VIRUS (HIV) DISEASE (HCC): ICD-10-CM

## 2020-05-20 LAB
A/G RATIO: 1.6 (ref 1.1–2.2)
ALBUMIN SERPL-MCNC: 4.6 G/DL (ref 3.4–5)
ALP BLD-CCNC: 49 U/L (ref 40–129)
ALT SERPL-CCNC: 20 U/L (ref 10–40)
ANION GAP SERPL CALCULATED.3IONS-SCNC: 13 MMOL/L (ref 3–16)
AST SERPL-CCNC: 19 U/L (ref 15–37)
BASOPHILS ABSOLUTE: 0 K/UL (ref 0–0.2)
BASOPHILS RELATIVE PERCENT: 0.5 %
BILIRUB SERPL-MCNC: 0.5 MG/DL (ref 0–1)
BUN BLDV-MCNC: 15 MG/DL (ref 7–20)
CALCIUM SERPL-MCNC: 9.8 MG/DL (ref 8.3–10.6)
CHLORIDE BLD-SCNC: 104 MMOL/L (ref 99–110)
CO2: 24 MMOL/L (ref 21–32)
CREAT SERPL-MCNC: 1.1 MG/DL (ref 0.8–1.3)
EOSINOPHILS ABSOLUTE: 0.1 K/UL (ref 0–0.6)
EOSINOPHILS RELATIVE PERCENT: 2.2 %
GFR AFRICAN AMERICAN: >60
GFR NON-AFRICAN AMERICAN: >60
GLOBULIN: 2.9 G/DL
GLUCOSE BLD-MCNC: 117 MG/DL (ref 70–99)
HCT VFR BLD CALC: 43.7 % (ref 40.5–52.5)
HEMOGLOBIN: 14.2 G/DL (ref 13.5–17.5)
LYMPHOCYTES ABSOLUTE: 1 K/UL (ref 1–5.1)
LYMPHOCYTES RELATIVE PERCENT: 30 %
MCH RBC QN AUTO: 28.2 PG (ref 26–34)
MCHC RBC AUTO-ENTMCNC: 32.5 G/DL (ref 31–36)
MCV RBC AUTO: 86.7 FL (ref 80–100)
MONOCYTES ABSOLUTE: 0.4 K/UL (ref 0–1.3)
MONOCYTES RELATIVE PERCENT: 11.4 %
NEUTROPHILS ABSOLUTE: 1.9 K/UL (ref 1.7–7.7)
NEUTROPHILS RELATIVE PERCENT: 55.9 %
PDW BLD-RTO: 16.3 % (ref 12.4–15.4)
PLATELET # BLD: 170 K/UL (ref 135–450)
PMV BLD AUTO: 9.8 FL (ref 5–10.5)
POTASSIUM SERPL-SCNC: 4.3 MMOL/L (ref 3.5–5.1)
RBC # BLD: 5.04 M/UL (ref 4.2–5.9)
SODIUM BLD-SCNC: 141 MMOL/L (ref 136–145)
TOTAL PROTEIN: 7.5 G/DL (ref 6.4–8.2)
WBC # BLD: 3.5 K/UL (ref 4–11)

## 2020-05-20 PROCEDURE — 99214 OFFICE O/P EST MOD 30 MIN: CPT | Performed by: INTERNAL MEDICINE

## 2020-05-20 RX ORDER — M-VIT,TX,IRON,MINS/CALC/FOLIC 27MG-0.4MG
1 TABLET ORAL DAILY
COMMUNITY

## 2020-05-20 NOTE — PROGRESS NOTES
emtricitabine-tenofovir (TRUVADA) 200-300 MG per tablet Take 1 tablet by mouth daily 90 tablet 3    amLODIPine (NORVASC) 10 MG tablet Take 1 tablet by mouth daily for 90 days. 90 tablet 3     No current facility-administered medications for this visit. Allergies:  Dye [iodides] and Pcn [penicillins]    Social History:    TOBACCO:    Never  ETOH:   None - quit  MARITAL STATUS:    (wife Minesh Gasca  in 2016)  OCCUPATION:   Retired  (worked at [employer])    Family History:   No immunodeficiency. REVIEW OF SYSTEMS:    No fever / chills / sweats. No weight loss. No visual change, eye pain, eye discharge. No oral lesion, sore throat, dysphagia. Denies cough / sputum. Denies chest pain, palpitations. Denies n / v / abd pain. No diarrhea. Denies dysuria or change in urinary function. Denies joint swelling or pain. No myalgia, arthralgia. Denies focal weakness, sensory change or other neurologic symptom. No symptoms endocrinopathy (stable exophthalmos). No symptoms hematologic disease. Tolerating antibiotics. PHYSICAL EXAM:    Vitals:    BP (!) 150/88   Temp 97.7 °F (36.5 °C)   Ht 6' 1\" (1.854 m)   Wt 194 lb 12.8 oz (88.4 kg)   BMI 25.70 kg/m²       GENERAL: No apparent distress.     HEENT: Membranes moist, no conjunctival changes, no oral lesion - + exophthalmos, no change  NECK:  Supple, no masses  LYMPH: No adenopathy   LUNGS: Clear b/l, no rales, no dullness  CARDIAC: RRR, no murmur appreciated  ABD:  + BS, soft / NT, no masses  :  Deferred   EXT:  No rash, no edema, no lesions  NEURO: No focal neurologic findings  PSYCH: Orientation, sensorium, mood normal      DATA:    12: WBC 3.3, Cr 1.2, AST / ALT , CD4 660, VL <20  13:  WBC 3.2, Cr 1.2, AST / ALT   10/1/13 WBC 3.2, Cr 1.2, CD4 526 (43%), VL <20  14 WBC 3.4, Cr 1.24, CD4 539 (46%), VL <20  10/8/14 WBC 3.1, Cr 1.3, CD4 526 (47%), VL <20  11/3/15 WBC 3.5, Cr 1.39, CD4 568, VL <20    IMPRESSION    HIV Database:   - Baseline: CD4, HIV RNA VL, HIV genotype, HLA B*5701 negative 7/27/15, hep serologies (10/2014 - A / B / C NR), RPR, G6PD;   - Annual: PPD, RPR , chol (every 6 mo if abnormal or on meds)   - 3-4 times a year: visit, CD4/VL, CBC, LFT  - Vaccines: influenza - refused 10/17/16, pneumovax 2012 / Nurys Milch 2/2/15, hep A vaccine (immune - 10/8/14), hep B vaccine (immune, past infection 10/9/14)   - STD screening, cervical / anal ca screening, other vaccines (Tdap, Zoster), other cancer screening    HIV Health Maintenance:  HIV:  Diagnosis / RF 1991 / sexual - heterosexual partners   HIV genotype     HLA   17 negative   CD4    17 - 665 (52)   HIV VL   17 - <20     Vaccines:   Pneumovax   2012 - refused new    Prevnar  2/2/15   Meningococcal vax  Refuses    Flu vax   Refused    HAV vax  10/8/14 HAV ab +   HBV vax   10/8/14 HBV S ab +    Tdap    10/17/16      Labs   G6PD       RPR    17 NR    PPD or quantiferon  2/2/15 neg    HCV    10/8/14 neg     Lipid panel          / STD  Exam    10/17/16     GC/CT       Anal PAP/HPV  10/17/16 non-diagnostic, refuses repeat      Referrals:  PCP    Aydee Duque  Endocrine  [facility]  Colonoscopy  Refused    HIV Counselin. Discussed with patient transmission, infection and prevention of HIV infection. 2. Discussed with patient testing of CD4 count and HIV viral load and how these test relate to HIV care. 3. Discussed with patient ongoing test for HIV care including blood counts, LFT, PPD, RPR.  4. Discussed with patient measures to prevent HIV transmission to others. Discussed sexual transmission and safe sex practices. Discussed avoidance of needle sharing or use that may promote HIV transmission. 5. Asked patient what question he / she has and addressed concerns.     PROBLEM LIST:  # HIV, on neviripine / Loreli Brenna since 2012 (had been on d4T, ddI, nevirapine since ), last labs - see above, VL consistently

## 2020-09-22 RX ORDER — HYDROCHLOROTHIAZIDE 25 MG/1
TABLET ORAL
Qty: 90 TABLET | Refills: 3 | Status: SHIPPED | OUTPATIENT
Start: 2020-09-22 | End: 2021-09-21

## 2021-01-18 DIAGNOSIS — B20 HUMAN IMMUNODEFICIENCY VIRUS (HIV) DISEASE (HCC): Chronic | ICD-10-CM

## 2021-01-18 RX ORDER — EMTRICITABINE AND TENOFOVIR ALAFENAMIDE 200; 25 MG/1; MG/1
TABLET ORAL
Qty: 90 TABLET | Refills: 3 | Status: SHIPPED | OUTPATIENT
Start: 2021-01-18 | End: 2021-12-16 | Stop reason: SDUPTHER

## 2021-04-07 ENCOUNTER — OFFICE VISIT (OUTPATIENT)
Dept: INFECTIOUS DISEASES | Age: 81
End: 2021-04-07
Payer: MEDICARE

## 2021-04-07 VITALS
SYSTOLIC BLOOD PRESSURE: 136 MMHG | HEART RATE: 67 BPM | DIASTOLIC BLOOD PRESSURE: 82 MMHG | TEMPERATURE: 97.1 F | OXYGEN SATURATION: 97 % | HEIGHT: 73 IN | BODY MASS INDEX: 26.14 KG/M2 | WEIGHT: 197.2 LBS

## 2021-04-07 DIAGNOSIS — R73.9 ELEVATED BLOOD SUGAR: ICD-10-CM

## 2021-04-07 DIAGNOSIS — H05.20 EXOPHTHALMOS: ICD-10-CM

## 2021-04-07 DIAGNOSIS — B20 HUMAN IMMUNODEFICIENCY VIRUS (HIV) DISEASE (HCC): ICD-10-CM

## 2021-04-07 DIAGNOSIS — E05.90 HYPERTHYROIDISM: ICD-10-CM

## 2021-04-07 DIAGNOSIS — B20 HUMAN IMMUNODEFICIENCY VIRUS (HIV) DISEASE (HCC): Primary | ICD-10-CM

## 2021-04-07 LAB
A/G RATIO: 1.8 (ref 1.1–2.2)
ALBUMIN SERPL-MCNC: 4.7 G/DL (ref 3.4–5)
ALP BLD-CCNC: 45 U/L (ref 40–129)
ALT SERPL-CCNC: 24 U/L (ref 10–40)
ANION GAP SERPL CALCULATED.3IONS-SCNC: 12 MMOL/L (ref 3–16)
AST SERPL-CCNC: 26 U/L (ref 15–37)
BASOPHILS ABSOLUTE: 0 K/UL (ref 0–0.2)
BASOPHILS RELATIVE PERCENT: 0.4 %
BILIRUB SERPL-MCNC: 0.5 MG/DL (ref 0–1)
BUN BLDV-MCNC: 16 MG/DL (ref 7–20)
CALCIUM SERPL-MCNC: 9.4 MG/DL (ref 8.3–10.6)
CHLORIDE BLD-SCNC: 103 MMOL/L (ref 99–110)
CO2: 29 MMOL/L (ref 21–32)
CREAT SERPL-MCNC: 1.3 MG/DL (ref 0.8–1.3)
EOSINOPHILS ABSOLUTE: 0.1 K/UL (ref 0–0.6)
EOSINOPHILS RELATIVE PERCENT: 2.5 %
GFR AFRICAN AMERICAN: >60
GFR NON-AFRICAN AMERICAN: 53
GLOBULIN: 2.6 G/DL
GLUCOSE BLD-MCNC: 109 MG/DL (ref 70–99)
HCT VFR BLD CALC: 42 % (ref 40.5–52.5)
HEMOGLOBIN: 14 G/DL (ref 13.5–17.5)
LYMPHOCYTES ABSOLUTE: 1.3 K/UL (ref 1–5.1)
LYMPHOCYTES RELATIVE PERCENT: 42.7 %
MCH RBC QN AUTO: 28.6 PG (ref 26–34)
MCHC RBC AUTO-ENTMCNC: 33.4 G/DL (ref 31–36)
MCV RBC AUTO: 85.7 FL (ref 80–100)
MONOCYTES ABSOLUTE: 0.4 K/UL (ref 0–1.3)
MONOCYTES RELATIVE PERCENT: 11.6 %
NEUTROPHILS ABSOLUTE: 1.3 K/UL (ref 1.7–7.7)
NEUTROPHILS RELATIVE PERCENT: 42.8 %
PDW BLD-RTO: 15.6 % (ref 12.4–15.4)
PLATELET # BLD: 151 K/UL (ref 135–450)
PMV BLD AUTO: 8.7 FL (ref 5–10.5)
POTASSIUM SERPL-SCNC: 4 MMOL/L (ref 3.5–5.1)
RBC # BLD: 4.9 M/UL (ref 4.2–5.9)
SODIUM BLD-SCNC: 144 MMOL/L (ref 136–145)
TOTAL PROTEIN: 7.3 G/DL (ref 6.4–8.2)
WBC # BLD: 3.1 K/UL (ref 4–11)

## 2021-04-07 PROCEDURE — 99215 OFFICE O/P EST HI 40 MIN: CPT | Performed by: INTERNAL MEDICINE

## 2021-04-07 NOTE — PROGRESS NOTES
Infectious Diseases HIV Outpatient Note    HIV history:   Diagnosed 1991 with lymphadenopathy. RF - multiple female sexual encounters (no MSM, IVDU). Seen in past by Dr Yolanda Kowalski and transferred to me 1/2012. Primary Care Physician: Bret Luis - PCP Dr Angie Sacks  History Obtained From:  Patient    CHIEF COMPLAINT:    Chief Complaint   Patient presents with    Follow-up     6 mo HIV       HISTORY OF PRESENT ILLNESS / Interval history:      Seen 10/9/13, 2/10/14, 6/9/14, 10/8/14, 2/2/15, 5/20/15, 6/1/15, 7/20/15, 10/26/15, 2/29/16, 6/13/16, 10/17/16, 4/17/17, 8/21/17, 2/19/18, 8/20/18, 1/28/19, 5/20/19, 10/14/19, 5/20/20    Today 4/7/21, pt feels good, taking HIV meds. Taking meds as prescribed, \"never misses a dose\". No new problem. No illness since last visit. Still with watery eyes. Discussed COVID-19, vaccine    Past Medical History:    HIV+, HTN, seasonal allergies, hyperthyroid    Past Surgical History:    Axillary LN (1991)      Current Outpatient Medications   Medication Sig Dispense Refill    DESCOVY 200-25 MG TABS tablet TAKE 1 TABLET DAILY 90 tablet 3    dolutegravir sodium (TIVICAY) 50 MG tablet Take 1 tablet by mouth daily 90 tablet 3    hydroCHLOROthiazide (HYDRODIURIL) 25 MG tablet TAKE 1 TABLET DAILY 90 tablet 3    Multiple Vitamins-Minerals (THERAPEUTIC MULTIVITAMIN-MINERALS) tablet Take 1 tablet by mouth daily      TIVICAY 50 MG tablet TAKE 1 TABLET DAILY 90 tablet 4    lisinopril (PRINIVIL;ZESTRIL) 10 MG tablet TAKE 1 TABLET DAILY 90 tablet 3    methimazole (TAPAZOLE) 10 MG tablet TAKE 1 TABLET DAILY 90 tablet 2    hydrochlorothiazide (HYDRODIURIL) 25 MG tablet Take 25 mg by mouth daily.       sildenafil (VIAGRA) 50 MG tablet Take 2 tablets by mouth as needed for Erectile Dysfunction Take 1 tablet PO as needed for Erectile Dysfunction (Patient not taking: Reported on 5/20/2020) 3 tablet 5    naproxen (NAPROSYN) 375 MG tablet Take 1 tablet by mouth 2 times daily (with meals) for 10 days 30 tablet 1    emtricitabine-tenofovir (TRUVADA) 200-300 MG per tablet Take 1 tablet by mouth daily 90 tablet 3    amLODIPine (NORVASC) 10 MG tablet Take 1 tablet by mouth daily for 90 days. 90 tablet 3     No current facility-administered medications for this visit. Allergies:  Dye [iodides] and Pcn [penicillins]    Social History:    TOBACCO:    Never  ETOH:   None - quit  MARITAL STATUS:    (wife Roger Mane  in 2016)  OCCUPATION:   Retired  (worked at [employer])    Family History:   No immunodeficiency. REVIEW OF SYSTEMS:    No fever / chills / sweats. No weight loss. No visual change, eye pain, eye discharge. No oral lesion, sore throat, dysphagia. Denies cough / sputum. Denies chest pain, palpitations. Denies n / v / abd pain. No diarrhea. Denies dysuria or change in urinary function. Denies joint swelling or pain. No myalgia, arthralgia. Denies focal weakness, sensory change or other neurologic symptom. No symptoms endocrinopathy (stable exophthalmos). No symptoms hematologic disease. Tolerating antibiotics. PHYSICAL EXAM:    Vitals:    /82 (Site: Left Upper Arm, Position: Sitting, Cuff Size: Small Adult)   Pulse 67   Temp 97.1 °F (36.2 °C) (Infrared)   Ht 6' 1\" (1.854 m)   Wt 197 lb 3.2 oz (89.4 kg)   SpO2 97% Comment: RA  BMI 26.02 kg/m²       GENERAL: No apparent distress.     HEENT: Membranes moist, no conjunctival changes, no oral lesion - + exophthalmos, no change  NECK:  Supple, no masses  LYMPH: No adenopathy   LUNGS: Clear b/l, no rales, no dullness  CARDIAC: RRR, no murmur appreciated  ABD:  + BS, soft / NT, no masses  :  Deferred   EXT:  No rash, no edema, no lesions  NEURO: No focal neurologic findings  PSYCH: Orientation, sensorium, mood normal      DATA:    12: WBC 3.3, Cr 1.2, AST / ALT  / , CD4 660, VL <20  13:  WBC 3.2, Cr 1.2, AST / ALT   10/1/13 WBC 3.2, Cr 1.2, CD4 526 (43%), VL <20  14 WBC 3.4, Cr 1.24, CD4 539 (46%), VL <20  10/8/14 WBC 3.1, Cr 1.3, CD4 526 (47%), VL <20  11/3/15 WBC 3.5, Cr 1.39, CD4 568, VL <20    IMPRESSION    HIV Database:   - Baseline: CD4, HIV RNA VL, HIV genotype, HLA B*5701 negative 7/27/15, hep serologies (10/2014 - A / B / C NR), RPR, G6PD;   - Annual: PPD, RPR , chol (every 6 mo if abnormal or on meds)   - 3-4 times a year: visit, CD4/VL, CBC, LFT  - Vaccines: influenza - refused 10/17/16, pneumovax 2012 / Lonie Hoose 2/2/15, hep A vaccine (immune - 10/8/14), hep B vaccine (immune, past infection 10/9/14)   - STD screening, cervical / anal ca screening, other vaccines (Tdap, Zoster), other cancer screening    HIV Health Maintenance:  HIV:  Diagnosis / [place]  / sexual - heterosexual partners   HIV genotype     HLA   17 negative   CD4    17 - 665 (52)   HIV VL   17 - <20     Vaccines:   Pneumovax   2012 - refused new    Prevnar  2/2/15   Meningococcal vax  Refuses    Flu vax   Refused    HAV vax  10/8/14 HAV ab +   HBV vax   10/8/14 HBV S ab +    Tdap    10/17/16      Labs   G6PD       RPR    17 NR    PPD or quantiferon  2/2/15 neg    HCV    10/8/14 neg     Lipid panel          / STD  Exam    10/17/16     GC/CT       Anal PAP/HPV  10/17/16 non-diagnostic, refuses repeat      Referrals:  PCP    Handy Pulliam  [facility]  Colonoscopy  Refused    HIV Counselin. Discussed with patient transmission, infection and prevention of HIV infection. 2. Discussed with patient testing of CD4 count and HIV viral load and how these test relate to HIV care. 3. Discussed with patient ongoing test for HIV care including blood counts, LFT, PPD, RPR.  4. Discussed with patient measures to prevent HIV transmission to others. Discussed sexual transmission and safe sex practices. Discussed avoidance of needle sharing or use that may promote HIV transmission. 5. Asked patient what question he / she has and addressed concerns.     PROBLEM LIST:  # HIV, on neviripine / Pina Brighter since 5/2012 (had been on d4T, ddI, nevirapine since 1997), last labs - see above, VL consistently suppressed by ultrasensitive assay, compliant / adherent and tolerating this regimen. Modified HIV meds 10/8/14 - substituted dolutegravir for nevirapine yet CD4 down VL up and pt was NOT taking this regimen on 6/1/15. Substituted descovy for truvada 6/2016. # Hyperthyroid - TSH /T4 normal 7/2013, sees Dr Duane Hoes ([address] [facility])  # HTN - BP normal today  # Elevated creatinine  # Erectile dysfunction - written for cialis, never used this  # testicular cyst - per pt, no details  # Seasonal rhinitis- better, if worse try OTC cetirizine  # Watery eyes, may allergic, try antihistimine drops, cetirizine drop are ketotifen 1 ggt each eye  # Testicular 'cyst' - s/p removal 7/18 Dr Tiff Montanez[facility]  # Anti-vaccine belief  # Does not want cancer screening    RECOMMENDATIONS:    1. HIV treatment - cont descovy (emtricitabine /TAF) 1/d + dolutegravir (tivicay). 2. Adherence  3. Labs today - CBC with diff , CMP, CD4, HIV VL, Hbg A1c  4. HIV care - see HIV maintenance above, data form. Refused vaccines,  exam and anal cancer screen. 4. OTC antihistamine for seasonal rhinitis; takes cetirizine (ketotifen) for watery eyes  5. BP med - Lisinopril 10/d, HCTZ 25/d; thyroid med - methimizole 5 mg/d - reviewed  6. RHCM - PPD (2/2015), pneumovax 4/17/17, prevnar 2/2/15, flu shot refused; RPR 3/1/16, chol panel 7/2015, MVI; never has had colonoscopy; Tdap 10/17/16; Refuses repeat anal cancer screen (swab for cytology / HPV - indeterminate / invalid 10/17/17 - refuses repeat - again 8/20/18)  7. Psychosocial -  7/2016, nutrition, exercise - discussed     Pt refuses indicated vaccine, procedures consistently - flu vaccine, meningococcal vaccine; colonoscopy, anal cancer screen. Have discussed risk of developing preventable disease.   He concerned / calls test / vaccines 'experimenting' - discussed all issues again today 4/7/21    Return 4 - 6 month    Spent 45 minutes with patient, reviewing data, discussing multiple problems and medications, formulating plan.   Over 50% of time with patient, educating and counseling about HIV care, medications, health screening    Pool Rousseau MD

## 2021-04-08 LAB
ESTIMATED AVERAGE GLUCOSE: 114 MG/DL
HBA1C MFR BLD: 5.6 %

## 2021-09-15 ENCOUNTER — OFFICE VISIT (OUTPATIENT)
Dept: INFECTIOUS DISEASES | Age: 81
End: 2021-09-15
Payer: MEDICARE

## 2021-09-15 VITALS
HEART RATE: 86 BPM | OXYGEN SATURATION: 98 % | RESPIRATION RATE: 18 BRPM | DIASTOLIC BLOOD PRESSURE: 70 MMHG | BODY MASS INDEX: 25.33 KG/M2 | WEIGHT: 192 LBS | SYSTOLIC BLOOD PRESSURE: 132 MMHG

## 2021-09-15 DIAGNOSIS — E05.90 HYPERTHYROIDISM: ICD-10-CM

## 2021-09-15 DIAGNOSIS — B20 HUMAN IMMUNODEFICIENCY VIRUS (HIV) DISEASE (HCC): Primary | ICD-10-CM

## 2021-09-15 DIAGNOSIS — H05.20 EXOPHTHALMOS: ICD-10-CM

## 2021-09-15 DIAGNOSIS — R73.9 ELEVATED BLOOD SUGAR: ICD-10-CM

## 2021-09-15 PROCEDURE — 99214 OFFICE O/P EST MOD 30 MIN: CPT | Performed by: INTERNAL MEDICINE

## 2021-09-15 NOTE — PROGRESS NOTES
Infectious Diseases HIV Outpatient Note    HIV history:   Diagnosed 1991 with lymphadenopathy. RF - multiple female sexual encounters (no MSM, IVDU). Seen in past by Dr Raina Kelly and transferred to me 1/2012. Primary Care Physician: Ulices Coburn - PCP Dr Ирина Desai  History Obtained From:  Patient    CHIEF COMPLAINT:    Chief Complaint   Patient presents with    Follow-up     HIV        HISTORY OF PRESENT ILLNESS / Interval history:      Seen 10/9/13, 2/10/14, 6/9/14, 10/8/14, 2/2/15, 5/20/15, 6/1/15, 7/20/15, 10/26/15, 2/29/16, 6/13/16, 10/17/16, 4/17/17, 8/21/17, 2/19/18, 8/20/18, 1/28/19, 5/20/19, 10/14/19, 5/20/20, 4/7/21    Today 9/15/21, pt feels good, taking HIV meds. Taking meds as prescribed, \"never misses a dose\". No new problem. No illness since last visit. Still with watery eyes. Discussed COVID-19, vaccines - will not take      Past Medical History:    HIV+, HTN, seasonal allergies, hyperthyroid    Past Surgical History:    Axillary LN (1991)      Current Outpatient Medications   Medication Sig Dispense Refill    DESCOVY 200-25 MG TABS tablet TAKE 1 TABLET DAILY 90 tablet 3    hydroCHLOROthiazide (HYDRODIURIL) 25 MG tablet TAKE 1 TABLET DAILY 90 tablet 3    Multiple Vitamins-Minerals (THERAPEUTIC MULTIVITAMIN-MINERALS) tablet Take 1 tablet by mouth daily      TIVICAY 50 MG tablet TAKE 1 TABLET DAILY 90 tablet 4    sildenafil (VIAGRA) 50 MG tablet Take 2 tablets by mouth as needed for Erectile Dysfunction Take 1 tablet PO as needed for Erectile Dysfunction 3 tablet 5    methimazole (TAPAZOLE) 10 MG tablet TAKE 1 TABLET DAILY 90 tablet 2    hydrochlorothiazide (HYDRODIURIL) 25 MG tablet Take 25 mg by mouth daily.       dolutegravir sodium (TIVICAY) 50 MG tablet Take 1 tablet by mouth daily 90 tablet 3    lisinopril (PRINIVIL;ZESTRIL) 10 MG tablet TAKE 1 TABLET DAILY 90 tablet 3    naproxen (NAPROSYN) 375 MG tablet Take 1 tablet by mouth 2 times daily (with meals) for 10 days 30 tablet 1    emtricitabine-tenofovir (TRUVADA) 200-300 MG per tablet Take 1 tablet by mouth daily 90 tablet 3    amLODIPine (NORVASC) 10 MG tablet Take 1 tablet by mouth daily for 90 days. 90 tablet 3     No current facility-administered medications for this visit. Allergies:  Dye [iodides] and Pcn [penicillins]    Social History:    TOBACCO:    Never  ETOH:   None - quit  MARITAL STATUS:    (wife Luisa Henderson  in 2016)  OCCUPATION:   Retired  (worked at [employer])    Family History:   No immunodeficiency. REVIEW OF SYSTEMS:    No fever / chills / sweats. No weight loss. No visual change, eye pain, eye discharge. No oral lesion, sore throat, dysphagia. Denies cough / sputum. Denies chest pain, palpitations. Denies n / v / abd pain. No diarrhea. Denies dysuria or change in urinary function. Denies joint swelling or pain. No myalgia, arthralgia. Denies focal weakness, sensory change or other neurologic symptom. No symptoms endocrinopathy (stable exophthalmos). No symptoms hematologic disease. Tolerating antibiotics. PHYSICAL EXAM:    Vitals:    /70   Pulse 86   Resp 18   Wt 192 lb (87.1 kg)   SpO2 98%   BMI 25.33 kg/m²       GENERAL: No apparent distress.     HEENT: Membranes moist, no conjunctival changes, no oral lesion - + exophthalmos, no change  NECK:  Supple, no masses  LYMPH: No adenopathy   LUNGS: Clear b/l, no rales, no dullness  CARDIAC: RRR, no murmur appreciated  ABD:  + BS, soft / NT, no masses  :  Deferred   EXT:  No rash, no edema, no lesions  NEURO: No focal neurologic findings  PSYCH: Orientation, sensorium, mood normal      DATA:    12: WBC 3.3, Cr 1.2, AST / ALT 23 / , CD4 660, VL <20  13:  WBC 3.2, Cr 1.2, AST / ALT   10/1/13 WBC 3.2, Cr 1.2, CD4 526 (43%), VL <20  14 WBC 3.4, Cr 1.24, CD4 539 (46%), VL <20  10/8/14 WBC 3.1, Cr 1.3, CD4 526 (47%), VL <20  11/3/15 WBC 3.5, Cr 1.39, CD4 568, VL <20    IMPRESSION    HIV Database:   - Baseline: CD4, HIV RNA VL, HIV genotype, HLA B*5701 negative 7/27/15, hep serologies (10/2014 - A / B / C NR), RPR, G6PD;   - Annual: PPD, RPR , chol (every 6 mo if abnormal or on meds)   - 3-4 times a year: visit, CD4/VL, CBC, LFT  - Vaccines: influenza - refused 10/17/16, pneumovax 2012 / Romelia Freeborn 2/2/15, hep A vaccine (immune - 10/8/14), hep B vaccine (immune, past infection 10/9/14)   - STD screening, cervical / anal ca screening, other vaccines (Tdap, Zoster), other cancer screening    HIV Health Maintenance:  HIV:  Diagnosis / [place]  / sexual - heterosexual partners   HIV genotype     HLA   17 negative   CD4    17 - 665 (52); 21 - 849 (56)   HIV VL   17 - <20; 21 <30     Vaccines:   Pneumovax   2012 - refused new    Prevnar  2/2/15   Meningococcal vax  Refuses    Flu vax   Refused    HAV vax  10/8/14 HAV ab +   HBV vax   10/8/14 HBV S ab +    Tdap    10/17/16      Labs   G6PD       RPR    17 NR    PPD or quantiferon  2/2/15 neg    HCV    10/8/14 neg     Lipid panel          / STD  Exam    10/17/16     GC/CT       Anal PAP/HPV  10/17/16 non-diagnostic, refuses repeat      Referrals:  PCP    Messi Centeno  [facility]  Colonoscopy  Refused    HIV Counselin. Discussed with patient transmission, infection and prevention of HIV infection. 2. Discussed with patient testing of CD4 count and HIV viral load and how these test relate to HIV care. 3. Discussed with patient ongoing test for HIV care including blood counts, LFT, PPD, RPR.  4. Discussed with patient measures to prevent HIV transmission to others. Discussed sexual transmission and safe sex practices. Discussed avoidance of needle sharing or use that may promote HIV transmission. 5. Asked patient what question he / she has and addressed concerns.     PROBLEM LIST:  # HIV, on neviripine / Letitia Sear since 2012 (had been on d4T, ddI, nevirapine since ), last labs - see above, VL consistently suppressed by ultrasensitive assay, compliant / adherent and tolerating this regimen. Modified HIV meds 10/8/14 - substituted dolutegravir for nevirapine yet CD4 down VL up and pt was NOT taking this regimen on 6/1/15. Substituted descovy for truvada 6/2016. # Hyperthyroid - TSH /T4 normal 7/2013, sees Dr Conchis Sheets ([address])  # HTN - BP normal today  # Elevated creatinine  # Erectile dysfunction - written for cialis, never used this  # testicular cyst - per pt, no details  # Seasonal rhinitis- better, if worse try OTC cetirizine  # Watery eyes, may allergic, try antihistimine drops, cetirizine drop are ketotifen 1 ggt each eye  # Testicular 'cyst' - s/p removal 7/[facility] Dr [facility]  # Anti-vaccine belief  # Does not want cancer screening    RECOMMENDATIONS:    1. HIV treatment - cont descovy (emtricitabine /TAF) 1/d + tivicay (dolutegravir). 2. Adherence  3. Labs today - prior to next appt  4. HIV care - see HIV maintenance above, data form. Refused vaccines,  exam and anal cancer screen. 4. OTC antihistamine for seasonal rhinitis; takes cetirizine (ketotifen) for watery eyes  5. BP med - Lisinopril 10/d, HCTZ 25/d; thyroid med - methimizole 5 mg/d - reviewed  6. RHCM - PPD (2/2015), pneumovax 4/17/17, prevnar 2/2/15, flu shot refused; RPR 3/1/16, chol panel 7/2015, MVI; never has had colonoscopy; Tdap 10/17/16; Refuses repeat anal cancer screen (swab for cytology / HPV - indeterminate / invalid 10/17/17 - refuses repeat - again 8/20/18)  7. Psychosocial -  7/2016, nutrition, exercise - discussed     Pt refuses indicated vaccine, procedures consistently - flu vaccine, meningococcal vaccine; colonoscopy, anal cancer screen. Have discussed risk of developing preventable disease.   He concerned / calls test / vaccines 'experimenting' - discussed all issues again today 4/7/21    Return 6 month    Spent 45 minutes with patient, reviewing data, discussing multiple problems and medications, formulating plan.   Over 50% of time with patient, educating and counseling about HIV care, medications, health screening    Vivian Romero MD

## 2021-09-21 RX ORDER — HYDROCHLOROTHIAZIDE 25 MG/1
TABLET ORAL
Qty: 90 TABLET | Refills: 3 | Status: SHIPPED | OUTPATIENT
Start: 2021-09-21 | End: 2022-08-29

## 2021-11-22 DIAGNOSIS — B20 HUMAN IMMUNODEFICIENCY VIRUS (HIV) DISEASE (HCC): ICD-10-CM

## 2021-11-22 LAB
A/G RATIO: 1.8 (ref 1.1–2.2)
ALBUMIN SERPL-MCNC: 4.9 G/DL (ref 3.4–5)
ALP BLD-CCNC: 50 U/L (ref 40–129)
ALT SERPL-CCNC: 22 U/L (ref 10–40)
ANION GAP SERPL CALCULATED.3IONS-SCNC: 12 MMOL/L (ref 3–16)
AST SERPL-CCNC: 16 U/L (ref 15–37)
BASOPHILS ABSOLUTE: 0 K/UL (ref 0–0.2)
BASOPHILS RELATIVE PERCENT: 0.4 %
BILIRUB SERPL-MCNC: 0.5 MG/DL (ref 0–1)
BUN BLDV-MCNC: 15 MG/DL (ref 7–20)
CALCIUM SERPL-MCNC: 9.8 MG/DL (ref 8.3–10.6)
CHLORIDE BLD-SCNC: 100 MMOL/L (ref 99–110)
CO2: 29 MMOL/L (ref 21–32)
CREAT SERPL-MCNC: 1.3 MG/DL (ref 0.8–1.3)
EOSINOPHILS ABSOLUTE: 0.1 K/UL (ref 0–0.6)
EOSINOPHILS RELATIVE PERCENT: 2.6 %
GFR AFRICAN AMERICAN: >60
GFR NON-AFRICAN AMERICAN: 53
GLUCOSE BLD-MCNC: 107 MG/DL (ref 70–99)
HCT VFR BLD CALC: 45.7 % (ref 40.5–52.5)
HEMOGLOBIN: 14.8 G/DL (ref 13.5–17.5)
LYMPHOCYTES ABSOLUTE: 1.3 K/UL (ref 1–5.1)
LYMPHOCYTES RELATIVE PERCENT: 35.7 %
MCH RBC QN AUTO: 28 PG (ref 26–34)
MCHC RBC AUTO-ENTMCNC: 32.4 G/DL (ref 31–36)
MCV RBC AUTO: 86.5 FL (ref 80–100)
MONOCYTES ABSOLUTE: 0.5 K/UL (ref 0–1.3)
MONOCYTES RELATIVE PERCENT: 12.7 %
NEUTROPHILS ABSOLUTE: 1.8 K/UL (ref 1.7–7.7)
NEUTROPHILS RELATIVE PERCENT: 48.6 %
PDW BLD-RTO: 15.3 % (ref 12.4–15.4)
PLATELET # BLD: 183 K/UL (ref 135–450)
PMV BLD AUTO: 9.2 FL (ref 5–10.5)
POTASSIUM SERPL-SCNC: 4.4 MMOL/L (ref 3.5–5.1)
RBC # BLD: 5.28 M/UL (ref 4.2–5.9)
SODIUM BLD-SCNC: 141 MMOL/L (ref 136–145)
TOTAL PROTEIN: 7.7 G/DL (ref 6.4–8.2)
VITAMIN D 25-HYDROXY: 34.3 NG/ML
WBC # BLD: 3.6 K/UL (ref 4–11)

## 2021-12-16 RX ORDER — EMTRICITABINE AND TENOFOVIR ALAFENAMIDE 200; 25 MG/1; MG/1
TABLET ORAL
Qty: 90 TABLET | Refills: 3 | Status: SHIPPED | OUTPATIENT
Start: 2021-12-16

## 2022-03-16 ENCOUNTER — OFFICE VISIT (OUTPATIENT)
Dept: INFECTIOUS DISEASES | Age: 82
End: 2022-03-16
Payer: MEDICARE

## 2022-03-16 VITALS
HEIGHT: 73 IN | TEMPERATURE: 99.3 F | SYSTOLIC BLOOD PRESSURE: 132 MMHG | WEIGHT: 193 LBS | RESPIRATION RATE: 16 BRPM | OXYGEN SATURATION: 98 % | HEART RATE: 71 BPM | BODY MASS INDEX: 25.58 KG/M2 | DIASTOLIC BLOOD PRESSURE: 80 MMHG

## 2022-03-16 DIAGNOSIS — E05.90 HYPERTHYROIDISM: ICD-10-CM

## 2022-03-16 DIAGNOSIS — H05.20 EXOPHTHALMOS: ICD-10-CM

## 2022-03-16 DIAGNOSIS — I10 ESSENTIAL HYPERTENSION: ICD-10-CM

## 2022-03-16 DIAGNOSIS — B20 HUMAN IMMUNODEFICIENCY VIRUS (HIV) DISEASE (HCC): Primary | ICD-10-CM

## 2022-03-16 DIAGNOSIS — Z28.20 VACCINE REFUSED BY PATIENT: ICD-10-CM

## 2022-03-16 PROCEDURE — 99215 OFFICE O/P EST HI 40 MIN: CPT | Performed by: INTERNAL MEDICINE

## 2022-03-16 RX ORDER — ROSUVASTATIN CALCIUM 5 MG/1
5 TABLET, COATED ORAL DAILY
COMMUNITY

## 2022-03-16 RX ORDER — METHIMAZOLE 5 MG/1
TABLET ORAL
COMMUNITY
Start: 2022-02-28 | End: 2022-03-16

## 2022-03-16 RX ORDER — METHIMAZOLE 5 MG/1
5 TABLET ORAL DAILY
COMMUNITY

## 2022-03-16 RX ORDER — LISINOPRIL 20 MG/1
20 TABLET ORAL DAILY
COMMUNITY

## 2022-03-16 NOTE — PROGRESS NOTES
Infectious Diseases HIV Outpatient Note    HIV history:   Diagnosed  with lymphadenopathy. RF - multiple female sexual encounters (no MSM, IVDU). Seen in past by Dr Cam Byers and transferred to me 2012. Primary Care Physician: Aries Billy - PCP Dr Feng Wiley  History Obtained From:  Patient    CHIEF COMPLAINT:    Chief Complaint   Patient presents with    Follow-up       HISTORY OF PRESENT ILLNESS / Interval history:      Seen 10/9/13, 2/10/14, 14, 10/8/14, 2/2/15, 5/20/15, 6/1/15, 7/20/15, 10/26/15, 16, 16, 10/17/16, 17, 17, 18, 18, 19, 19, 10/14/19, 20, 21, 9/15/21. Today 3/16/22, pt feels good, taking HIV meds. Taking meds as prescribed, \"never misses a dose\". No new problem. No illness since last visit. Still with watery eyes. Discussed COVID-19, vaccines - will not take      Past Medical History:    HIV+, HTN, seasonal allergies, hyperthyroid    Past Surgical History:    Axillary LN ()      Current Outpatient Medications   Medication Sig Dispense Refill    DESCOVY 200-25 MG TABS tablet TAKE 1 TABLET DAILY 90 tablet 3    hydroCHLOROthiazide (HYDRODIURIL) 25 MG tablet TAKE 1 TABLET DAILY 90 tablet 3    dolutegravir sodium (TIVICAY) 50 MG tablet Take 1 tablet by mouth daily 90 tablet 3    Multiple Vitamins-Minerals (THERAPEUTIC MULTIVITAMIN-MINERALS) tablet Take 1 tablet by mouth daily      amLODIPine (NORVASC) 10 MG tablet Take 1 tablet by mouth daily for 90 days. 90 tablet 3    lisinopril (PRINIVIL;ZESTRIL) 10 MG tablet TAKE 1 TABLET DAILY 90 tablet 3     No current facility-administered medications for this visit. Allergies:  Dye [iodides] and Pcn [penicillins]    Social History:    TOBACCO:    Never  ETOH:   None - quit  MARITAL STATUS:    (wife Timoteo Brunson  in 2016)  OCCUPATION:   Retired  (worked at [employer])    Family History:   No immunodeficiency.     REVIEW OF SYSTEMS:    No fever / chills / sweats. No weight loss. No visual change, eye pain, eye discharge. No oral lesion, sore throat, dysphagia. Denies cough / sputum. Denies chest pain, palpitations. Denies n / v / abd pain. No diarrhea. Denies dysuria or change in urinary function. Denies joint swelling or pain. No myalgia, arthralgia. Denies focal weakness, sensory change or other neurologic symptom. No symptoms endocrinopathy (stable exophthalmos). No symptoms hematologic disease. Tolerating antibiotics. PHYSICAL EXAM:    Vitals:    /80   Pulse 71   Temp 99.3 °F (37.4 °C) (Temporal)   Resp 16   Ht 6' 1\" (1.854 m)   Wt 193 lb (87.5 kg)   SpO2 98%   BMI 25.46 kg/m²       GENERAL: No apparent distress.     HEENT: Membranes moist, no conjunctival changes, no oral lesion - + exophthalmos, no change  NECK:  Supple, no masses  LYMPH: No adenopathy   LUNGS: Clear b/l, no rales, no dullness  CARDIAC: RRR, no murmur appreciated  ABD:  + BS, soft / NT, no masses  :  Deferred   EXT:  No rash, no edema, no lesions  NEURO: No focal neurologic findings  PSYCH: Orientation, sensorium, mood normal      DATA:    12/14/12: WBC 3.3, Cr 1.2, AST / ALT 23 / 26, CD4 660, VL <20  7/5/13:  WBC 3.2, Cr 1.2, AST / ALT   10/1/13 WBC 3.2, Cr 1.2, CD4 526 (43%), VL <20  1/9/14 WBC 3.4, Cr 1.24, CD4 539 (46%), VL <20  10/8/14 WBC 3.1, Cr 1.3, CD4 526 (47%), VL <20  11/3/15 WBC 3.5, Cr 1.39, CD4 568, VL <20    IMPRESSION    HIV Database:   - Baseline: CD4, HIV RNA VL, HIV genotype, HLA B*5701 negative 7/27/15, hep serologies (10/2014 - A / B / C NR), RPR, G6PD;   - Annual: PPD, RPR , chol (every 6 mo if abnormal or on meds)   - 3-4 times a year: visit, CD4/VL, CBC, LFT  - Vaccines: influenza - refused 10/17/16, pneumovax 1/2012 / Messer Ana Maria 2/2/15, hep A vaccine (immune - 10/8/14), hep B vaccine (immune, past infection 10/9/14)   - STD screening, cervical / anal ca screening, other vaccines (Tdap, Zoster), other cancer screening    HIV Health Maintenance:  HIV:  Diagnosis / RF 1991 / sexual - heterosexual partners   HIV genotype     HLA   17 negative   CD4    17 - 665 (52); 21 - 849 (56)   HIV VL   17 - <20; 21 <30     Vaccines:   Pneumovax   2012 - refused new    Prevnar  2/2/15   Meningococcal vax  Refuses    Flu vax   Refused    HAV vax  10/8/14 HAV ab +   HBV vax   10/8/14 HBV S ab +    Tdap    10/17/16   COVID-19  Refuses      Labs   G6PD       RPR    17 NR    PPD or quantiferon  2/2/15 neg    HCV    10/8/14 neg     Lipid panel          / STD  Exam    10/17/16     GC/CT       Anal PAP/HPV  10/17/16 non-diagnostic, refuses repeat      Referrals:  PCP    Alessandro Overton  [facility]  Colonoscopy  Refused    HIV Counselin. Discussed with patient transmission, infection and prevention of HIV infection. 2. Discussed with patient testing of CD4 count and HIV viral load and how these test relate to HIV care. 3. Discussed with patient ongoing test for HIV care including blood counts, LFT, PPD, RPR.  4. Discussed with patient measures to prevent HIV transmission to others. Discussed sexual transmission and safe sex practices. Discussed avoidance of needle sharing or use that may promote HIV transmission. 5. Asked patient what question he / she has and addressed concerns. PROBLEM LIST:  # HIV, on neviripine / Miguel Mantis since 2012 (had been on d4T, ddI, nevirapine since ), last labs - see above, VL consistently suppressed by ultrasensitive assay, compliant / adherent and tolerating this regimen. Modified HIV meds 10/8/14 - substituted dolutegravir for nevirapine yet CD4 down VL up and pt was NOT taking this regimen on 6/1/15. Substituted descovy for truvada 2016.   # Hyperthyroid - TSH /T4 normal 2013, sees Dr Abilio Morton ([facility])  # HTN - BP normal today  # Elevated creatinine  # Erectile dysfunction - written for cialis, never used this  # testicular cyst - per pt, no details  # Seasonal rhinitis- better, if worse try OTC cetirizine  # Watery eyes, may allergic, try antihistimine drops, cetirizine drop are ketotifen 1 ggt each eye  # Testicular 'cyst' - s/p removal 7/18 Dr [facility]oncal[facility]  # Anti-vaccine belief  # Does not want cancer screening    RECOMMENDATIONS:    1. HIV treatment - cont descovy (emtricitabine /TAF) 1/d + tivicay (dolutegravir). 2. Adherence - excellent  3. Labs - in 2 mo (6 mo after last), labs ordered in EPIC  4. HIV care - see HIV maintenance above, data form. Refused vaccines,  exam and anal cancer screen. 4. OTC antihistamine for seasonal rhinitis; takes cetirizine (ketotifen) for watery eyes  5. BP med - Lisinopril 10/d, HCTZ 25/d; thyroid med - methimizole 5 mg/d - reviewed  6. RHCM - PPD (2/2015), pneumovax 4/17/17, prevnar 2/2/15, flu shot refused; RPR 3/1/16, chol panel 7/2015, MVI; never has had colonoscopy; Tdap 10/17/16; Refuses repeat anal cancer screen (swab for cytology / HPV - indeterminate / invalid 10/17/17 - refuses repeat - again 8/20/18)  7. Psychosocial -  7/2016, nutrition, exercise - discussed     Pt refuses indicated vaccines, procedures consistently - flu vaccine, meningococcal vaccine; colonoscopy, anal cancer screen. Have discussed risk of developing preventable diseases. He concerned / calls test / vaccines 'experimenting' - discussed all issues again today 4/7/21    Sees PCP    Return 6 month    Spent 45 minutes with patient, reviewing data, discussing multiple problems and medications, formulating plan.   Over 50% of time with patient, educating and counseling about HIV care, medications, health screening    Kameron Daugherty MD

## 2022-03-17 NOTE — PROGRESS NOTES
Completed med rec via patient interview, [facility] chart review, and fill history.      Seb Holcomb, PharmD, BCPS  3/17/2022 1:30 PM      For Pharmacy Admin Tracking Only     CPA in place:  No   Intervention Detail: CMR/TIP Completed   Gap Closed?: No    Time Spent (min): 10

## 2022-05-27 DIAGNOSIS — B20 HUMAN IMMUNODEFICIENCY VIRUS (HIV) DISEASE (HCC): ICD-10-CM

## 2022-05-27 LAB
A/G RATIO: 1.7 (ref 1.1–2.2)
ALBUMIN SERPL-MCNC: 4.8 G/DL (ref 3.4–5)
ALP BLD-CCNC: 49 U/L (ref 40–129)
ALT SERPL-CCNC: 22 U/L (ref 10–40)
ANION GAP SERPL CALCULATED.3IONS-SCNC: 13 MMOL/L (ref 3–16)
AST SERPL-CCNC: 18 U/L (ref 15–37)
BASOPHILS ABSOLUTE: 0 K/UL (ref 0–0.2)
BASOPHILS RELATIVE PERCENT: 0.4 %
BILIRUB SERPL-MCNC: 0.5 MG/DL (ref 0–1)
BUN BLDV-MCNC: 13 MG/DL (ref 7–20)
CALCIUM SERPL-MCNC: 9.8 MG/DL (ref 8.3–10.6)
CHLORIDE BLD-SCNC: 99 MMOL/L (ref 99–110)
CO2: 27 MMOL/L (ref 21–32)
CREAT SERPL-MCNC: 1.2 MG/DL (ref 0.8–1.3)
EOSINOPHILS ABSOLUTE: 0.1 K/UL (ref 0–0.6)
EOSINOPHILS RELATIVE PERCENT: 2.6 %
GFR AFRICAN AMERICAN: >60
GFR NON-AFRICAN AMERICAN: 58
GLUCOSE BLD-MCNC: 114 MG/DL (ref 70–99)
HCT VFR BLD CALC: 43.8 % (ref 40.5–52.5)
HEMOGLOBIN: 14.4 G/DL (ref 13.5–17.5)
LYMPHOCYTES ABSOLUTE: 1.2 K/UL (ref 1–5.1)
LYMPHOCYTES RELATIVE PERCENT: 34.5 %
MCH RBC QN AUTO: 28 PG (ref 26–34)
MCHC RBC AUTO-ENTMCNC: 32.9 G/DL (ref 31–36)
MCV RBC AUTO: 85.3 FL (ref 80–100)
MONOCYTES ABSOLUTE: 0.4 K/UL (ref 0–1.3)
MONOCYTES RELATIVE PERCENT: 11.7 %
NEUTROPHILS ABSOLUTE: 1.8 K/UL (ref 1.7–7.7)
NEUTROPHILS RELATIVE PERCENT: 50.8 %
PDW BLD-RTO: 15.5 % (ref 12.4–15.4)
PLATELET # BLD: 189 K/UL (ref 135–450)
PMV BLD AUTO: 8.4 FL (ref 5–10.5)
POTASSIUM SERPL-SCNC: 4.4 MMOL/L (ref 3.5–5.1)
RBC # BLD: 5.14 M/UL (ref 4.2–5.9)
SODIUM BLD-SCNC: 139 MMOL/L (ref 136–145)
TOTAL PROTEIN: 7.7 G/DL (ref 6.4–8.2)
VITAMIN D 25-HYDROXY: 31.5 NG/ML
WBC # BLD: 3.5 K/UL (ref 4–11)

## 2022-08-29 RX ORDER — HYDROCHLOROTHIAZIDE 25 MG/1
TABLET ORAL
Qty: 90 TABLET | Refills: 3 | Status: SHIPPED | OUTPATIENT
Start: 2022-08-29

## 2022-09-14 ENCOUNTER — OFFICE VISIT (OUTPATIENT)
Dept: INFECTIOUS DISEASES | Age: 82
End: 2022-09-14
Payer: MEDICARE

## 2022-09-14 VITALS
BODY MASS INDEX: 25.84 KG/M2 | WEIGHT: 195 LBS | OXYGEN SATURATION: 96 % | SYSTOLIC BLOOD PRESSURE: 110 MMHG | DIASTOLIC BLOOD PRESSURE: 60 MMHG | HEIGHT: 73 IN | HEART RATE: 67 BPM

## 2022-09-14 DIAGNOSIS — I10 ESSENTIAL HYPERTENSION: ICD-10-CM

## 2022-09-14 DIAGNOSIS — E05.90 HYPERTHYROIDISM: ICD-10-CM

## 2022-09-14 DIAGNOSIS — H05.20 EXOPHTHALMOS: ICD-10-CM

## 2022-09-14 DIAGNOSIS — B20 HUMAN IMMUNODEFICIENCY VIRUS (HIV) DISEASE (HCC): Primary | ICD-10-CM

## 2022-09-14 DIAGNOSIS — Z28.20 VACCINE REFUSED BY PATIENT: ICD-10-CM

## 2022-09-14 PROCEDURE — 99215 OFFICE O/P EST HI 40 MIN: CPT | Performed by: INTERNAL MEDICINE

## 2022-09-14 PROCEDURE — 1123F ACP DISCUSS/DSCN MKR DOCD: CPT | Performed by: INTERNAL MEDICINE

## 2022-09-14 NOTE — PROGRESS NOTES
Infectious Diseases HIV Outpatient Note    HIV history:   Diagnosed  with lymphadenopathy. RF - multiple female sexual encounters (no MSM, IVDU). Seen in past by Dr Dany Chou and transferred to me 2012. Primary Care Physician: Libby Tom - PCP Dr Gayatri Fermin  History Obtained From:  Patient    CHIEF COMPLAINT:    Chief Complaint   Patient presents with    6 Month Follow-Up       HISTORY OF PRESENT ILLNESS / Interval history:      Seen 10/9/13, 2/10/14, 14, 10/8/14, 2/2/15, 5/20/15, 6/1/15, 7/20/15, 10/26/15, 16, 16, 10/17/16, 17, 17, 18, 18, 19, 19, 10/14/19, 20, 21, 9/15/21, 3/16/22    Today 22, pt feels good, taking HIV meds. Taking meds as prescribed, \"never misses a dose\". No new problem. No illness since last visit. Still with watery eyes. Discussed COVID-19, vaccines - will not take      Past Medical History:    HIV+, HTN, seasonal allergies, hyperthyroid    Past Surgical History:    Axillary LN ()      Current Outpatient Medications   Medication Sig Dispense Refill    hydroCHLOROthiazide (HYDRODIURIL) 25 MG tablet TAKE 1 TABLET DAILY 90 tablet 3    lisinopril (PRINIVIL;ZESTRIL) 20 MG tablet Take 20 mg by mouth daily      rosuvastatin (CRESTOR) 5 MG tablet Take 5 mg by mouth daily      methIMAzole (TAPAZOLE) 5 MG tablet Take 5 mg by mouth daily      DESCOVY 200-25 MG TABS tablet TAKE 1 TABLET DAILY 90 tablet 3    Multiple Vitamins-Minerals (THERAPEUTIC MULTIVITAMIN-MINERALS) tablet Take 1 tablet by mouth daily      dolutegravir sodium (TIVICAY) 50 MG tablet Take 1 tablet by mouth daily 90 tablet 3     No current facility-administered medications for this visit.        Allergies:  Dye [iodides] and Pcn [penicillins]    Social History:    TOBACCO:    Never  ETOH:   None - quit  MARITAL STATUS:    (wife Swetha Ventura  in 2016)  OCCUPATION:   Retired  (worked at [employer])    Family History:   No immunodeficiency. REVIEW OF SYSTEMS:    No fever / chills / sweats. No weight loss. No visual change, eye pain, eye discharge. No oral lesion, sore throat, dysphagia. Denies cough / sputum. Denies chest pain, palpitations. Denies n / v / abd pain. No diarrhea. Denies dysuria or change in urinary function. Denies joint swelling or pain. No myalgia, arthralgia. Denies focal weakness, sensory change or other neurologic symptom. No symptoms endocrinopathy (stable exophthalmos). No symptoms hematologic disease. Tolerating antibiotics. PHYSICAL EXAM:    Vitals:    /60 (Site: Left Upper Arm, Position: Sitting, Cuff Size: Medium Adult)   Pulse 67   Ht 6' 1\" (1.854 m)   Wt 195 lb (88.5 kg)   SpO2 96%   BMI 25.73 kg/m²       GENERAL: No apparent distress.     HEENT: Membranes moist, no conjunctival changes, no oral lesion - + exophthalmos, no change  NECK:  Supple, no masses  LYMPH: No adenopathy   LUNGS: Clear b/l, no rales, no dullness  CARDIAC: RRR, no murmur appreciated  ABD:  + BS, soft / NT, no masses  :  Deferred   EXT:  No rash, no edema, no lesions  NEURO: No focal neurologic findings  PSYCH: Orientation, sensorium, mood normal      DATA:    12/14/12: WBC 3.3, Cr 1.2, AST / ALT 23 / 26, CD4 660, VL <20  7/5/13:  WBC 3.2, Cr 1.2, AST / ALT   10/1/13 WBC 3.2, Cr 1.2, CD4 526 (43%), VL <20  1/9/14 WBC 3.4, Cr 1.24, CD4 539 (46%), VL <20  10/8/14 WBC 3.1, Cr 1.3, CD4 526 (47%), VL <20  11/3/15 WBC 3.5, Cr 1.39, CD4 568, VL <20    IMPRESSION    HIV Database:   - Baseline: CD4, HIV RNA VL, HIV genotype, HLA B*5701 negative 7/27/15, hep serologies (10/2014 - A / B / C NR), RPR, G6PD;   - Annual: PPD, RPR , chol (every 6 mo if abnormal or on meds)   - 3-4 times a year: visit, CD4/VL, CBC, LFT  - Vaccines: influenza - refused 10/17/16, pneumovax 1/2012 / Kevin Catena 2/2/15, hep A vaccine (immune - 10/8/14), hep B vaccine (immune, past infection 10/9/14)   - STD screening, cervical / anal ca screening, other vaccines (Tdap, Zoster), other cancer screening    HIV Health Maintenance:  HIV:  Diagnosis / RF  / sexual - heterosexual partners   HIV genotype     HLA   17 negative   CD4    17 - 665 (52); 21 - 849 (56)   HIV VL   17 - <20; 21 <30     Vaccines:   Pneumovax   2012 - refused new    Prevnar  2/2/15   Meningococcal vax  Refuses    Flu vax   Refused    HAV vax  10/8/14 HAV ab +   HBV vax   10/8/14 HBV S ab +    Tdap    10/17/16   COVID-19  Refuses      Labs   G6PD       RPR    17 NR    PPD or quantiferon  2/2/15 neg    HCV    10/8/14 neg     Lipid panel          / STD  Exam    10/17/16     GC/CT       Anal PAP/HPV 10/17/16 non-diagnostic, refuses repeat      Referrals:  PCP    Ion Gillespie  [facility]  Colonoscopy  Refused    HIV Counselin. Discussed with patient transmission, infection and prevention of HIV infection. 2. Discussed with patient testing of CD4 count and HIV viral load and how these test relate to HIV care. 3. Discussed with patient ongoing test for HIV care including blood counts, LFT, PPD, RPR.  4. Discussed with patient measures to prevent HIV transmission to others. Discussed sexual transmission and safe sex practices. Discussed avoidance of needle sharing or use that may promote HIV transmission. 5. Asked patient what question he / she has and addressed concerns. PROBLEM LIST:  # HIV, on neviripine / Linard Billow since 2012 (had been on d4T, ddI, nevirapine since ), last labs - see above, VL consistently suppressed by ultrasensitive assay, compliant / adherent and tolerating this regimen. Modified HIV meds 10/8/14 - substituted dolutegravir for nevirapine yet CD4 down VL up and pt was NOT taking this regimen on 6/1/15. Substituted descovy for truvada 2016.   # Hyperthyroid - TSH /T4 normal 2013, sees Dr Devon Montilla ([facility])  # HTN - BP normal today  # Elevated creatinine  # Erectile dysfunction - written for cialis, never used this  # testicular cyst - per pt, no details  # Seasonal rhinitis- better, if worse try OTC cetirizine  # Watery eyes, may allergic, try antihistimine drops, cetirizine drop are ketotifen 1 ggt each eye  # Testicular 'cyst' - s/p removal 7/[facility] Dr [facility]  # Anti-vaccine belief  # Does not want cancer screening    RECOMMENDATIONS:    1. HIV treatment - cont Descovy (emtricitabine /TAF) 1/d + Tivicay (dolutegravir). 2. Adherence - excellent  3. Labs - done 5/27/22 (CD4 677, VL <30)  4. HIV care - see HIV maintenance above, data form. Refused vaccines,  exam and anal cancer screen. 4. OTC antihistamine for seasonal rhinitis; takes cetirizine (ketotifen) for watery eyes  5. BP med - Lisinopril 10/d, HCTZ 25/d; thyroid med - methimizole 5 mg/d - reviewed  6. RHCM - PPD (2/2015), pneumovax 4/17/17, prevnar 2/2/15, flu shot refused; RPR 3/1/16, chol panel 7/2015, MVI; never has had colonoscopy; Tdap 10/17/16; Refuses repeat anal cancer screen (swab for cytology / HPV - indeterminate / invalid 10/17/17 - refuses repeat - again 8/20/18)  7. Psychosocial -  7/2016, nutrition, exercise - discussed     Pt refuses indicated vaccines, procedures consistently - flu vaccine, meningococcal vaccine; colonoscopy, anal cancer screen. Have discussed risk of developing preventable diseases. He concerned / calls test / vaccines 'experimenting' - discussed all issues again today 4/7/21    Sees PCP    Return 6 month, repeat labs prior to next visit     Spent 45 minutes with patient, reviewing data, discussing multiple problems and medications, formulating plan.   Over 50% of time with patient, educating and counseling about HIV care, medications, health screening    Tramaine Bradford MD

## 2023-03-15 ENCOUNTER — OFFICE VISIT (OUTPATIENT)
Dept: INFECTIOUS DISEASES | Age: 83
End: 2023-03-15
Payer: MEDICARE

## 2023-03-15 VITALS
BODY MASS INDEX: 25.99 KG/M2 | OXYGEN SATURATION: 96 % | SYSTOLIC BLOOD PRESSURE: 145 MMHG | DIASTOLIC BLOOD PRESSURE: 91 MMHG | WEIGHT: 197 LBS | HEART RATE: 85 BPM

## 2023-03-15 DIAGNOSIS — B20 HUMAN IMMUNODEFICIENCY VIRUS (HIV) DISEASE (HCC): Primary | Chronic | ICD-10-CM

## 2023-03-15 DIAGNOSIS — R73.9 ELEVATED BLOOD SUGAR: ICD-10-CM

## 2023-03-15 DIAGNOSIS — E05.90 HYPERTHYROIDISM: ICD-10-CM

## 2023-03-15 DIAGNOSIS — B20 HUMAN IMMUNODEFICIENCY VIRUS (HIV) DISEASE (HCC): Chronic | ICD-10-CM

## 2023-03-15 DIAGNOSIS — I10 ESSENTIAL HYPERTENSION: ICD-10-CM

## 2023-03-15 LAB
25(OH)D3 SERPL-MCNC: 30.5 NG/ML
ALBUMIN SERPL-MCNC: 4.7 G/DL (ref 3.4–5)
ALBUMIN/GLOB SERPL: 1.7 {RATIO} (ref 1.1–2.2)
ALP SERPL-CCNC: 54 U/L (ref 40–129)
ALT SERPL-CCNC: 26 U/L (ref 10–40)
ANION GAP SERPL CALCULATED.3IONS-SCNC: 13 MMOL/L (ref 3–16)
AST SERPL-CCNC: 20 U/L (ref 15–37)
BASOPHILS # BLD: 0 K/UL (ref 0–0.2)
BASOPHILS NFR BLD: 0.3 %
BILIRUB SERPL-MCNC: 0.5 MG/DL (ref 0–1)
BUN SERPL-MCNC: 11 MG/DL (ref 7–20)
CALCIUM SERPL-MCNC: 9.8 MG/DL (ref 8.3–10.6)
CHLORIDE SERPL-SCNC: 98 MMOL/L (ref 99–110)
CO2 SERPL-SCNC: 28 MMOL/L (ref 21–32)
CREAT SERPL-MCNC: 1.2 MG/DL (ref 0.8–1.3)
DEPRECATED RDW RBC AUTO: 15.4 % (ref 12.4–15.4)
EOSINOPHIL # BLD: 0.1 K/UL (ref 0–0.6)
EOSINOPHIL NFR BLD: 2 %
GFR SERPLBLD CREATININE-BSD FMLA CKD-EPI: >60 ML/MIN/{1.73_M2}
GLUCOSE SERPL-MCNC: 120 MG/DL (ref 70–99)
HCT VFR BLD AUTO: 45.5 % (ref 40.5–52.5)
HGB BLD-MCNC: 14.7 G/DL (ref 13.5–17.5)
LYMPHOCYTES # BLD: 1.1 K/UL (ref 1–5.1)
LYMPHOCYTES NFR BLD: 33.6 %
MCH RBC QN AUTO: 27.7 PG (ref 26–34)
MCHC RBC AUTO-ENTMCNC: 32.4 G/DL (ref 31–36)
MCV RBC AUTO: 85.7 FL (ref 80–100)
MONOCYTES # BLD: 0.4 K/UL (ref 0–1.3)
MONOCYTES NFR BLD: 11.4 %
NEUTROPHILS # BLD: 1.8 K/UL (ref 1.7–7.7)
NEUTROPHILS NFR BLD: 52.7 %
PLATELET # BLD AUTO: 185 K/UL (ref 135–450)
PMV BLD AUTO: 8.8 FL (ref 5–10.5)
POTASSIUM SERPL-SCNC: 4.5 MMOL/L (ref 3.5–5.1)
PROT SERPL-MCNC: 7.5 G/DL (ref 6.4–8.2)
RBC # BLD AUTO: 5.32 M/UL (ref 4.2–5.9)
SODIUM SERPL-SCNC: 139 MMOL/L (ref 136–145)
WBC # BLD AUTO: 3.4 K/UL (ref 4–11)

## 2023-03-15 PROCEDURE — 99215 OFFICE O/P EST HI 40 MIN: CPT | Performed by: INTERNAL MEDICINE

## 2023-03-15 PROCEDURE — 3077F SYST BP >= 140 MM HG: CPT | Performed by: INTERNAL MEDICINE

## 2023-03-15 PROCEDURE — 1123F ACP DISCUSS/DSCN MKR DOCD: CPT | Performed by: INTERNAL MEDICINE

## 2023-03-15 PROCEDURE — 3080F DIAST BP >= 90 MM HG: CPT | Performed by: INTERNAL MEDICINE

## 2023-03-15 NOTE — PROGRESS NOTES
Infectious Diseases HIV Outpatient Note    HIV history:   Diagnosed  with lymphadenopathy. RF - multiple female sexual encounters (no MSM, IVDU). Seen in past by Dr Leonie Silver and transferred to me 2012. Primary Care Physician: Maxx Head - PCP Dr Lev Mcfarland  History Obtained From:  Patient    CHIEF COMPLAINT:    Chief Complaint   Patient presents with    Follow-up     ART       HISTORY OF PRESENT ILLNESS / Interval history:      Seen 10/9/13, 2/10/14, 14, 10/8/14, 2/2/15, 5/20/15, 6/1/15, 7/20/15, 10/26/15, 16, 16, 10/17/16, 17, 17, 18, 18, 19, 19, 10/14/19, 20, 21, 9/15/21, 3/16/22, 22. Today 3/15/23, pt feels good, taking HIV meds. Taking meds as prescribed, \"never misses a dose\". No new problem. No illness since last visit. Reviewed med problems, meds - HTN, HL, Grave D  Still with watery eyes. Discussed COVID-19, influenza, vaccines - will not take      Past Medical History:    HIV+, HTN, seasonal allergies, hyperthyroid    Past Surgical History:    Axillary LN ()      Current Outpatient Medications   Medication Sig Dispense Refill    hydroCHLOROthiazide (HYDRODIURIL) 25 MG tablet TAKE 1 TABLET DAILY 90 tablet 3    lisinopril (PRINIVIL;ZESTRIL) 20 MG tablet Take 20 mg by mouth daily      rosuvastatin (CRESTOR) 5 MG tablet Take 5 mg by mouth daily      methIMAzole (TAPAZOLE) 5 MG tablet Take 5 mg by mouth daily      DESCOVY 200-25 MG TABS tablet TAKE 1 TABLET DAILY 90 tablet 3    Multiple Vitamins-Minerals (THERAPEUTIC MULTIVITAMIN-MINERALS) tablet Take 1 tablet by mouth daily      dolutegravir sodium (TIVICAY) 50 MG tablet Take 1 tablet by mouth daily 90 tablet 3     No current facility-administered medications for this visit.        Allergies:  Dye [iodides] and Pcn [penicillins]    Social History:    TOBACCO:    Never  ETOH:   None - quit  MARITAL STATUS:    (wife Dulce Maria Miranda  in 2016)  OCCUPATION:   Retired 1997 (worked at [employer])    Family History:   No immunodeficiency. REVIEW OF SYSTEMS:    No fever / chills / sweats. No weight loss. No visual change, eye pain, eye discharge. No oral lesion, sore throat, dysphagia. Denies cough / sputum. Denies chest pain, palpitations. Denies n / v / abd pain. No diarrhea. Denies dysuria or change in urinary function. Denies joint swelling or pain. No myalgia, arthralgia. Denies focal weakness, sensory change or other neurologic symptom. No symptoms endocrinopathy (stable exophthalmos). No symptoms hematologic disease. Tolerating antibiotics. PHYSICAL EXAM:    Vitals:    BP (!) 145/91   Pulse 85   Wt 197 lb (89.4 kg)   SpO2 96%   BMI 25.99 kg/m²       GENERAL: No apparent distress.     HEENT: Membranes moist, no conjunctival changes, no oral lesion - + exophthalmos, no change  NECK:  Supple, no masses  LYMPH: No adenopathy   LUNGS: Clear b/l, no rales, no dullness  CARDIAC: RRR, no murmur appreciated  ABD:  + BS, soft / NT, no masses  :  Deferred   EXT:  No rash, no edema, no lesions  NEURO: No focal neurologic findings  PSYCH: Orientation, sensorium, mood normal      DATA:    12/14/12: WBC 3.3, Cr 1.2, AST / ALT 23 / 26, CD4 660, VL <20  7/5/13:  WBC 3.2, Cr 1.2, AST / ALT   10/1/13 WBC 3.2, Cr 1.2, CD4 526 (43%), VL <20  1/9/14 WBC 3.4, Cr 1.24, CD4 539 (46%), VL <20  10/8/14 WBC 3.1, Cr 1.3, CD4 526 (47%), VL <20  11/3/15 WBC 3.5, Cr 1.39, CD4 568, VL <20    IMPRESSION    HIV Database:   - Baseline: CD4, HIV RNA VL, HIV genotype, HLA B*[id number] negative 7/27/15, hep serologies (10/2014 - A / B / C NR), RPR, G6PD;   - Annual: PPD, RPR , chol (every 6 mo if abnormal or on meds)   - 3-4 times a year: visit, CD4/VL, CBC, LFT  - Vaccines: influenza - refused 10/17/16, pneumovax 1/2012 / Arthur Magyar 2/2/15, hep A vaccine (immune - 10/8/14), hep B vaccine (immune, past infection 10/9/14)   - STD screening, cervical / anal ca screening, other vaccines (Tdap, Zoster), other cancer screening    HIV Health Maintenance:  HIV:  Diagnosis / RF 1991 / sexual - heterosexual partners   HIV genotype     HLA   17 negative   CD4    17 - 665 (52); 21 - 849 (56)   HIV VL   17 - <20; 21 <30     Vaccines:   Pneumovax   2012 - refused new    Prevnar  2/2/15   Meningococcal vax  Refuses    Flu vax   Refused    HAV vax  10/8/14 HAV ab +   HBV vax   10/8/14 HBV S ab +    Tdap    10/17/16   COVID-19  Refuses      Labs   G6PD       RPR    17 NR    PPD or quantiferon  2/2/15 neg    HCV    10/8/14 neg     Lipid panel          / STD  Exam    10/17/16     GC/CT       Anal PAP/HPV 10/17/16 non-diagnostic, refuses repeat      Referrals:  PCP    Laurel Meckel  Endocrine  [place]  Colonoscopy  Refused    HIV Counselin. Discussed with patient transmission, infection and prevention of HIV infection. 2. Discussed with patient testing of CD4 count and HIV viral load and how these test relate to HIV care. 3. Discussed with patient ongoing test for HIV care including blood counts, LFT, PPD, RPR.  4. Discussed with patient measures to prevent HIV transmission to others. Discussed sexual transmission and safe sex practices. Discussed avoidance of needle sharing or use that may promote HIV transmission. 5. Asked patient what question he / she has and addressed concerns. PROBLEM LIST:  # HIV, on neviripine / Jovani Chimes since 2012 (had been on d4T, ddI, nevirapine since ), last labs - see above, VL consistently suppressed by ultrasensitive assay, compliant / adherent and tolerating this regimen. Modified HIV meds 10/8/14 - substituted dolutegravir for nevirapine yet CD4 down VL up and pt was NOT taking this regimen on 6/1/15. Substituted descovy for truvada 2016.   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 HIV treatment - cont Descovy (emtricitabine /TAF) 1/d + Tivicay (dolutegravir). 2. Adherence - excellent  3. Labs - done 5/27/22 (CD4 677, VL <30)  4. HIV care - see HIV maintenance above, data form. Refused vaccines,  exam and anal cancer screen. 4. OTC antihistamine for seasonal rhinitis; takes cetirizine (ketotifen) for watery eyes  5. BP med - Lisinopril 10/d, HCTZ 25/d; thyroid med - methimizole 5 mg/d - reviewed  6. RHCM - PPD (2/2015), pneumovax 4/17/17, prevnar 2/2/15, flu shot refused; RPR 3/1/16, chol panel 7/2015, MVI; never has had colonoscopy; Tdap 10/17/16; Refuses repeat anal cancer screen (swab for cytology / HPV - indeterminate / invalid 10/17/17 - refuses repeat - again 8/20/18)  7. Psychosocial -  7/2016, nutrition, exercise - discussed     Pt refuses indicated vaccines, procedures consistently - flu vaccine, meningococcal vaccine; colonoscopy, anal cancer screen. Have discussed risk of developing preventable diseases. He concerned / calls test / vaccines 'experimenting' - discussed all issues again today - 3/15/23    Sees PCP - Dr Danni Arteaga, last visit 10/26/22, reviewed note and labs from this visit     Return 6 month    Spent 40 minutes with patient, reviewing data, discussing multiple problems and medications, formulating plan.   Over 50% of time with patient, educating and counseling about HIV care, medications, health screening    Estela Sebastian MD

## 2023-03-17 LAB
HIV-1 QNT LOG, IU/ML: <1.47 LOG CPY/ML
HIV-1 QNT, IU/ML: ABNORMAL CPY/ML
INTERPRETATION: DETECTED

## 2023-03-27 DIAGNOSIS — B20 HUMAN IMMUNODEFICIENCY VIRUS (HIV) DISEASE (HCC): Primary | ICD-10-CM

## 2023-08-24 RX ORDER — HYDROCHLOROTHIAZIDE 25 MG/1
TABLET ORAL
Qty: 90 TABLET | Refills: 3 | Status: SHIPPED | OUTPATIENT
Start: 2023-08-24

## 2023-09-20 ENCOUNTER — OFFICE VISIT (OUTPATIENT)
Dept: INFECTIOUS DISEASES | Age: 83
End: 2023-09-20
Payer: MEDICARE

## 2023-09-20 VITALS
TEMPERATURE: 97.9 F | DIASTOLIC BLOOD PRESSURE: 79 MMHG | OXYGEN SATURATION: 90 % | SYSTOLIC BLOOD PRESSURE: 133 MMHG | HEIGHT: 73 IN | BODY MASS INDEX: 24.73 KG/M2 | WEIGHT: 186.6 LBS | HEART RATE: 70 BPM

## 2023-09-20 DIAGNOSIS — H05.20 EXOPHTHALMOS: ICD-10-CM

## 2023-09-20 DIAGNOSIS — E05.90 HYPERTHYROIDISM: ICD-10-CM

## 2023-09-20 DIAGNOSIS — Z28.20 VACCINE REFUSED BY PATIENT: ICD-10-CM

## 2023-09-20 DIAGNOSIS — I10 ESSENTIAL HYPERTENSION: ICD-10-CM

## 2023-09-20 DIAGNOSIS — B20 HUMAN IMMUNODEFICIENCY VIRUS (HIV) DISEASE (HCC): Primary | ICD-10-CM

## 2023-09-20 PROCEDURE — 3075F SYST BP GE 130 - 139MM HG: CPT | Performed by: INTERNAL MEDICINE

## 2023-09-20 PROCEDURE — 1123F ACP DISCUSS/DSCN MKR DOCD: CPT | Performed by: INTERNAL MEDICINE

## 2023-09-20 PROCEDURE — 3078F DIAST BP <80 MM HG: CPT | Performed by: INTERNAL MEDICINE

## 2023-09-20 PROCEDURE — 99215 OFFICE O/P EST HI 40 MIN: CPT | Performed by: INTERNAL MEDICINE

## 2023-09-20 NOTE — PROGRESS NOTES
Infectious Diseases HIV Outpatient Note    HIV history:   Diagnosed 1991 with lymphadenopathy. RF - multiple female sexual encounters (no MSM, IVDU). Seen in past by Dr Heather Ward and transferred to me 1/2012. Primary Care Physician: Sherry Blank - PCP Dr Sonya Forman  History Obtained From:  Patient    CHIEF COMPLAINT:    Chief Complaint   Patient presents with    Follow-up     ART - no missed doses  Right shoulder muscle pain from cutting hedges         HISTORY OF PRESENT ILLNESS / Interval history:      Seen 10/9/13, 2/10/14, 6/9/14, 10/8/14, 2/2/15, 5/20/15, 6/1/15, 7/20/15, 10/26/15, 2/29/16, 6/13/16, 10/17/16, 4/17/17, 8/21/17, 2/19/18, 8/20/18, 1/28/19, 5/20/19, 10/14/19, 5/20/20, 4/7/21, 9/15/21, 3/16/22, 9/14/22, 3/15/23    Today 9/20/23, pt feels good, taking HIV meds. Taking meds (Tivicay + Descovy) as prescribed, \"never misses a dose\". R shoulder pain - injured 2 weeks ago doing hedges, hurts under shoulder blade  No illness since last visit. Still with watery eyes. Reviewed med problems, meds - HTN, HL, Grave D  Discussed COVID-19, influenza, vaccines - will not take      Past Medical History:    HIV+, HTN, seasonal allergies, hyperthyroid    Past Surgical History:    Axillary LN (1991)      Current Outpatient Medications   Medication Sig Dispense Refill    hydroCHLOROthiazide (HYDRODIURIL) 25 MG tablet TAKE 1 TABLET DAILY 90 tablet 3    lisinopril (PRINIVIL;ZESTRIL) 20 MG tablet Take 1 tablet by mouth daily      rosuvastatin (CRESTOR) 5 MG tablet Take 1 tablet by mouth daily      methIMAzole (TAPAZOLE) 5 MG tablet Take 1 tablet by mouth daily      DESCOVY 200-25 MG TABS tablet TAKE 1 TABLET DAILY 90 tablet 3    Multiple Vitamins-Minerals (THERAPEUTIC MULTIVITAMIN-MINERALS) tablet Take 1 tablet by mouth daily      dolutegravir sodium (TIVICAY) 50 MG tablet Take 1 tablet by mouth daily 90 tablet 3     No current facility-administered medications for this visit.        Allergies:  Dye

## 2024-06-12 ENCOUNTER — OFFICE VISIT (OUTPATIENT)
Dept: INFECTIOUS DISEASES | Age: 84
End: 2024-06-12
Payer: MEDICARE

## 2024-06-12 VITALS
HEIGHT: 73 IN | SYSTOLIC BLOOD PRESSURE: 133 MMHG | DIASTOLIC BLOOD PRESSURE: 78 MMHG | OXYGEN SATURATION: 93 % | TEMPERATURE: 97.9 F | WEIGHT: 194 LBS | BODY MASS INDEX: 25.71 KG/M2 | HEART RATE: 76 BPM

## 2024-06-12 DIAGNOSIS — B20 HUMAN IMMUNODEFICIENCY VIRUS (HIV) DISEASE (HCC): Primary | ICD-10-CM

## 2024-06-12 DIAGNOSIS — Z28.20 VACCINE REFUSED BY PATIENT: ICD-10-CM

## 2024-06-12 DIAGNOSIS — I10 ESSENTIAL HYPERTENSION: ICD-10-CM

## 2024-06-12 DIAGNOSIS — E05.90 HYPERTHYROIDISM: ICD-10-CM

## 2024-06-12 DIAGNOSIS — B20 HUMAN IMMUNODEFICIENCY VIRUS (HIV) DISEASE (HCC): ICD-10-CM

## 2024-06-12 LAB
25(OH)D3 SERPL-MCNC: 27.3 NG/ML
ALBUMIN SERPL-MCNC: 4.6 G/DL (ref 3.4–5)
ALBUMIN/GLOB SERPL: 1.5 {RATIO} (ref 1.1–2.2)
ALP SERPL-CCNC: 64 U/L (ref 40–129)
ALT SERPL-CCNC: 19 U/L (ref 10–40)
ANION GAP SERPL CALCULATED.3IONS-SCNC: 9 MMOL/L (ref 3–16)
AST SERPL-CCNC: 16 U/L (ref 15–37)
BASOPHILS # BLD: 0 K/UL (ref 0–0.2)
BASOPHILS NFR BLD: 0.3 %
BILIRUB SERPL-MCNC: 0.4 MG/DL (ref 0–1)
BUN SERPL-MCNC: 17 MG/DL (ref 7–20)
CALCIUM SERPL-MCNC: 9.9 MG/DL (ref 8.3–10.6)
CHLORIDE SERPL-SCNC: 102 MMOL/L (ref 99–110)
CO2 SERPL-SCNC: 30 MMOL/L (ref 21–32)
CREAT SERPL-MCNC: 1.1 MG/DL (ref 0.8–1.3)
DEPRECATED RDW RBC AUTO: 15.7 % (ref 12.4–15.4)
EOSINOPHIL # BLD: 0.1 K/UL (ref 0–0.6)
EOSINOPHIL NFR BLD: 3.3 %
GFR SERPLBLD CREATININE-BSD FMLA CKD-EPI: 67 ML/MIN/{1.73_M2}
GLUCOSE SERPL-MCNC: 110 MG/DL (ref 70–99)
HCT VFR BLD AUTO: 41.6 % (ref 40.5–52.5)
HGB BLD-MCNC: 13.7 G/DL (ref 13.5–17.5)
LYMPHOCYTES # BLD: 1.1 K/UL (ref 1–5.1)
LYMPHOCYTES NFR BLD: 26.7 %
MCH RBC QN AUTO: 27.2 PG (ref 26–34)
MCHC RBC AUTO-ENTMCNC: 32.9 G/DL (ref 31–36)
MCV RBC AUTO: 82.7 FL (ref 80–100)
MONOCYTES # BLD: 0.4 K/UL (ref 0–1.3)
MONOCYTES NFR BLD: 9.6 %
NEUTROPHILS # BLD: 2.6 K/UL (ref 1.7–7.7)
NEUTROPHILS NFR BLD: 60.1 %
PLATELET # BLD AUTO: 197 K/UL (ref 135–450)
PMV BLD AUTO: 8.7 FL (ref 5–10.5)
POTASSIUM SERPL-SCNC: 4.5 MMOL/L (ref 3.5–5.1)
PROT SERPL-MCNC: 7.6 G/DL (ref 6.4–8.2)
RBC # BLD AUTO: 5.03 M/UL (ref 4.2–5.9)
SODIUM SERPL-SCNC: 141 MMOL/L (ref 136–145)
WBC # BLD AUTO: 4.3 K/UL (ref 4–11)

## 2024-06-12 PROCEDURE — 99215 OFFICE O/P EST HI 40 MIN: CPT | Performed by: INTERNAL MEDICINE

## 2024-06-12 PROCEDURE — 3078F DIAST BP <80 MM HG: CPT | Performed by: INTERNAL MEDICINE

## 2024-06-12 PROCEDURE — 3075F SYST BP GE 130 - 139MM HG: CPT | Performed by: INTERNAL MEDICINE

## 2024-06-12 PROCEDURE — 1123F ACP DISCUSS/DSCN MKR DOCD: CPT | Performed by: INTERNAL MEDICINE

## 2024-06-12 NOTE — PROGRESS NOTES
regimen on 6/1/15.  Substituted descovy for truvada 6/2016.  # Hyperthyroid - TSH /T4 normal 7/2013, sees Dr Humphreys ([facility])  # HTN - BP normal today  # Elevated creatinine  # Erectile dysfunction - written for cialis, never used this  # testicular cyst - per pt, no details  # Seasonal rhinitis- better, if worse try OTC cetirizine  # Watery eyes, may allergic, try antihistimine drops, cetirizine drop are ketotifen 1 ggt each eye  # Testicular 'cyst' - s/p removal [facility] Dr [facility]  # Anti-vaccine belief  # Does not want cancer screening  # R shoulder strain - new problem 9/20/23, onging / stable 6/12/24    RECOMMENDATIONS:    1. HIV treatment - cont Descovy (emtricitabine /TAF) 1/d + Tivicay (dolutegravir).    2. Adherence - excellent  3. Labs - done 3/27/23 (CD4 808, VL <30) - check prior or at next visit   4. HIV care - see HIV maintenance above, data form.  Refused vaccines,  exam and anal cancer screen.    4. OTC antihistamine for seasonal rhinitis; takes cetirizine (ketotifen) for watery eyes  5. BP med - Lisinopril 10/d, HCTZ 25/d; thyroid med - methimizole 5 mg/d - reviewed  6. RHCM - PPD (2/2015), pneumovax 4/17/17, prevnar 2/2/15, flu shot refused; RPR 3/1/16, chol panel 7/2015, MVI; never has had colonoscopy; Tdap 10/17/16; Refuses repeat anal cancer screen (swab for cytology / HPV - indeterminate / invalid 10/17/17 - refuses repeat - again 8/20/18)  7. Psychosocial -  7/2016, nutrition, exercise - discussed     Pt refuses indicated vaccines, procedures consistently - flu vaccine, meningococcal vaccine; colonoscopy, anal cancer screen.  Have discussed risk of developing preventable diseases.  He concerned / calls test / vaccines 'experimenting' - discussed all issues again today - 9/20/23    Sees PCP - Dr Garcia, last visit 5/10/23, reviewed note and labs from this visit     Return 6 month    Spent 40 minutes with patient, reviewing data, discussing multiple problems and medications,

## 2024-12-04 ENCOUNTER — OFFICE VISIT (OUTPATIENT)
Dept: INFECTIOUS DISEASES | Age: 84
End: 2024-12-04
Payer: MEDICARE

## 2024-12-04 VITALS
DIASTOLIC BLOOD PRESSURE: 78 MMHG | OXYGEN SATURATION: 98 % | BODY MASS INDEX: 25.07 KG/M2 | HEART RATE: 71 BPM | SYSTOLIC BLOOD PRESSURE: 141 MMHG | WEIGHT: 190 LBS

## 2024-12-04 DIAGNOSIS — B20 HUMAN IMMUNODEFICIENCY VIRUS (HIV) DISEASE (HCC): Chronic | ICD-10-CM

## 2024-12-04 DIAGNOSIS — H05.20 EXOPHTHALMOS: Primary | ICD-10-CM

## 2024-12-04 DIAGNOSIS — I10 ESSENTIAL HYPERTENSION: ICD-10-CM

## 2024-12-04 DIAGNOSIS — E05.90 HYPERTHYROIDISM: ICD-10-CM

## 2024-12-04 PROCEDURE — 1123F ACP DISCUSS/DSCN MKR DOCD: CPT | Performed by: INTERNAL MEDICINE

## 2024-12-04 PROCEDURE — 99215 OFFICE O/P EST HI 40 MIN: CPT | Performed by: INTERNAL MEDICINE

## 2024-12-04 PROCEDURE — 3078F DIAST BP <80 MM HG: CPT | Performed by: INTERNAL MEDICINE

## 2024-12-04 PROCEDURE — 3077F SYST BP >= 140 MM HG: CPT | Performed by: INTERNAL MEDICINE

## 2024-12-04 PROCEDURE — 1159F MED LIST DOCD IN RCRD: CPT | Performed by: INTERNAL MEDICINE

## 2024-12-04 RX ORDER — EMTRICITABINE AND TENOFOVIR ALAFENAMIDE 200; 25 MG/1; MG/1
TABLET ORAL
Qty: 90 TABLET | Refills: 3 | Status: SHIPPED | OUTPATIENT
Start: 2024-12-04

## 2024-12-04 NOTE — PROGRESS NOTES
Infectious Diseases HIV Outpatient Note    HIV history:   Diagnosed 1991 with lymphadenopathy.  RF - multiple female sexual encounters (no MSM, IVDU).  Seen in past by Dr Beebe and transferred to me 1/2012.    Primary Care Physician: Krystal Garcia MD - PCP Dr Kathleen Garcia  History Obtained From:  Patient    CHIEF COMPLAINT:    Chief Complaint   Patient presents with    Follow-up     ART - no missed doses ---- no refills pt is being double billed and no one is getting back to him       HISTORY OF PRESENT ILLNESS / Interval history:      Seen 10/9/13, 2/10/14, 6/9/14, 10/8/14, 2/2/15, 5/20/15, 6/1/15, 7/20/15, 10/26/15, 2/29/16, 6/13/16, 10/17/16, 4/17/17, 8/21/17, 2/19/18, 8/20/18, 1/28/19, 5/20/19, 10/14/19, 5/20/20, 4/7/21, 9/15/21, 3/16/22, 9/14/22, 3/15/23, 9/20/23, 6/12/24    Today 12/4/24 (previous visit 6/12/24)     Taking meds (Tivicay + Descovy) as prescribed, \"never misses a dose\".     Issue with meds and insurance / [facility]  Has arthritis - rx     No illness since last visit.  R shoulder pain - 'still there', aches  Reviewed med problems, meds - HTN, HL, Grave D  Saw PCP, Dr Garcia on 12/2 - recommended Voltaren gel for arthritis  Discussed COVID-19, influenza, vaccines - will not take vaccine      Past Medical History:    HIV+, HTN, seasonal allergies, hyperthyroid    Past Surgical History:    Axillary LN (1991)      Current Outpatient Medications   Medication Sig Dispense Refill    hydroCHLOROthiazide (HYDRODIURIL) 25 MG tablet TAKE 1 TABLET DAILY 90 tablet 3    lisinopril (PRINIVIL;ZESTRIL) 20 MG tablet Take 1 tablet by mouth daily      rosuvastatin (CRESTOR) 5 MG tablet Take 1 tablet by mouth daily      methIMAzole (TAPAZOLE) 5 MG tablet Take 1 tablet by mouth daily      DESCOVY 200-25 MG TABS tablet TAKE 1 TABLET DAILY 90 tablet 3    dolutegravir sodium (TIVICAY) 50 MG tablet Take 1 tablet by mouth daily 90 tablet 3    Multiple Vitamins-Minerals (THERAPEUTIC MULTIVITAMIN-MINERALS) tablet Take 1

## 2024-12-09 RX ORDER — EMTRICITABINE AND TENOFOVIR ALAFENAMIDE 200; 25 MG/1; MG/1
TABLET ORAL
Qty: 90 TABLET | Refills: 3 | Status: SHIPPED | OUTPATIENT
Start: 2024-12-09

## 2025-06-11 ENCOUNTER — OFFICE VISIT (OUTPATIENT)
Dept: INFECTIOUS DISEASES | Age: 85
End: 2025-06-11
Payer: MEDICARE

## 2025-06-11 VITALS
WEIGHT: 190 LBS | SYSTOLIC BLOOD PRESSURE: 152 MMHG | BODY MASS INDEX: 25.07 KG/M2 | OXYGEN SATURATION: 94 % | HEART RATE: 67 BPM | DIASTOLIC BLOOD PRESSURE: 85 MMHG

## 2025-06-11 DIAGNOSIS — I10 ESSENTIAL HYPERTENSION: ICD-10-CM

## 2025-06-11 DIAGNOSIS — E05.90 HYPERTHYROIDISM: ICD-10-CM

## 2025-06-11 DIAGNOSIS — B20 HUMAN IMMUNODEFICIENCY VIRUS (HIV) DISEASE (HCC): Chronic | ICD-10-CM

## 2025-06-11 DIAGNOSIS — Z28.20 VACCINE REFUSED BY PATIENT: ICD-10-CM

## 2025-06-11 DIAGNOSIS — H05.20 EXOPHTHALMOS: ICD-10-CM

## 2025-06-11 DIAGNOSIS — B20 HUMAN IMMUNODEFICIENCY VIRUS (HIV) DISEASE (HCC): Primary | Chronic | ICD-10-CM

## 2025-06-11 LAB
ALBUMIN SERPL-MCNC: 4.6 G/DL (ref 3.4–5)
ALBUMIN/GLOB SERPL: 1.8 {RATIO} (ref 1.1–2.2)
ALP SERPL-CCNC: 45 U/L (ref 40–129)
ALT SERPL-CCNC: 35 U/L (ref 10–40)
ANION GAP SERPL CALCULATED.3IONS-SCNC: 9 MMOL/L (ref 3–16)
AST SERPL-CCNC: 30 U/L (ref 15–37)
BASOPHILS # BLD: 0 K/UL (ref 0–0.2)
BASOPHILS NFR BLD: 0.3 %
BILIRUB SERPL-MCNC: 0.5 MG/DL (ref 0–1)
BUN SERPL-MCNC: 12 MG/DL (ref 7–20)
CALCIUM SERPL-MCNC: 9.5 MG/DL (ref 8.3–10.6)
CHLORIDE SERPL-SCNC: 105 MMOL/L (ref 99–110)
CO2 SERPL-SCNC: 29 MMOL/L (ref 21–32)
CREAT SERPL-MCNC: 1.2 MG/DL (ref 0.8–1.3)
DEPRECATED RDW RBC AUTO: 15.8 % (ref 12.4–15.4)
EOSINOPHIL # BLD: 0.1 K/UL (ref 0–0.6)
EOSINOPHIL NFR BLD: 2.9 %
GFR SERPLBLD CREATININE-BSD FMLA CKD-EPI: 60 ML/MIN/{1.73_M2}
GLUCOSE SERPL-MCNC: 106 MG/DL (ref 70–99)
HCT VFR BLD AUTO: 42 % (ref 40.5–52.5)
HGB BLD-MCNC: 14 G/DL (ref 13.5–17.5)
LYMPHOCYTES # BLD: 1.4 K/UL (ref 1–5.1)
LYMPHOCYTES NFR BLD: 39.8 %
MCH RBC QN AUTO: 28.1 PG (ref 26–34)
MCHC RBC AUTO-ENTMCNC: 33.5 G/DL (ref 31–36)
MCV RBC AUTO: 83.9 FL (ref 80–100)
MONOCYTES # BLD: 0.4 K/UL (ref 0–1.3)
MONOCYTES NFR BLD: 12.5 %
NEUTROPHILS # BLD: 1.5 K/UL (ref 1.7–7.7)
NEUTROPHILS NFR BLD: 44.5 %
PLATELET # BLD AUTO: 154 K/UL (ref 135–450)
PMV BLD AUTO: 9 FL (ref 5–10.5)
POTASSIUM SERPL-SCNC: 4 MMOL/L (ref 3.5–5.1)
PROT SERPL-MCNC: 7.1 G/DL (ref 6.4–8.2)
RBC # BLD AUTO: 5 M/UL (ref 4.2–5.9)
SODIUM SERPL-SCNC: 143 MMOL/L (ref 136–145)
WBC # BLD AUTO: 3.4 K/UL (ref 4–11)

## 2025-06-11 PROCEDURE — 99214 OFFICE O/P EST MOD 30 MIN: CPT | Performed by: INTERNAL MEDICINE

## 2025-06-11 PROCEDURE — 1159F MED LIST DOCD IN RCRD: CPT | Performed by: INTERNAL MEDICINE

## 2025-06-11 PROCEDURE — 1123F ACP DISCUSS/DSCN MKR DOCD: CPT | Performed by: INTERNAL MEDICINE

## 2025-06-11 PROCEDURE — 3077F SYST BP >= 140 MM HG: CPT | Performed by: INTERNAL MEDICINE

## 2025-06-11 PROCEDURE — 3079F DIAST BP 80-89 MM HG: CPT | Performed by: INTERNAL MEDICINE
